# Patient Record
Sex: MALE | Race: BLACK OR AFRICAN AMERICAN | NOT HISPANIC OR LATINO
[De-identification: names, ages, dates, MRNs, and addresses within clinical notes are randomized per-mention and may not be internally consistent; named-entity substitution may affect disease eponyms.]

---

## 2017-03-20 ENCOUNTER — APPOINTMENT (OUTPATIENT)
Dept: UROLOGY | Facility: CLINIC | Age: 47
End: 2017-03-20

## 2017-04-24 ENCOUNTER — RECORD ABSTRACTING (OUTPATIENT)
Age: 47
End: 2017-04-24

## 2017-04-24 DIAGNOSIS — Z83.3 FAMILY HISTORY OF DIABETES MELLITUS: ICD-10-CM

## 2017-04-24 DIAGNOSIS — Z86.79 PERSONAL HISTORY OF OTHER DISEASES OF THE CIRCULATORY SYSTEM: ICD-10-CM

## 2017-04-24 DIAGNOSIS — E78.00 PURE HYPERCHOLESTEROLEMIA, UNSPECIFIED: ICD-10-CM

## 2017-04-24 DIAGNOSIS — Z82.49 FAMILY HISTORY OF ISCHEMIC HEART DISEASE AND OTHER DISEASES OF THE CIRCULATORY SYSTEM: ICD-10-CM

## 2017-04-24 DIAGNOSIS — Z78.9 OTHER SPECIFIED HEALTH STATUS: ICD-10-CM

## 2017-05-08 ENCOUNTER — APPOINTMENT (OUTPATIENT)
Dept: UROLOGY | Facility: CLINIC | Age: 47
End: 2017-05-08

## 2017-05-08 VITALS
BODY MASS INDEX: 34.51 KG/M2 | DIASTOLIC BLOOD PRESSURE: 84 MMHG | HEART RATE: 105 BPM | WEIGHT: 233 LBS | HEIGHT: 69 IN | SYSTOLIC BLOOD PRESSURE: 142 MMHG

## 2017-05-08 RX ORDER — OXAPROZIN 600 MG/1
TABLET, FILM COATED ORAL
Refills: 0 | Status: DISCONTINUED | COMMUNITY
End: 2017-05-08

## 2017-05-24 ENCOUNTER — APPOINTMENT (OUTPATIENT)
Dept: UROLOGY | Facility: CLINIC | Age: 47
End: 2017-05-24

## 2017-05-24 VITALS
WEIGHT: 233 LBS | SYSTOLIC BLOOD PRESSURE: 144 MMHG | HEART RATE: 106 BPM | BODY MASS INDEX: 34.51 KG/M2 | HEIGHT: 69 IN | DIASTOLIC BLOOD PRESSURE: 80 MMHG

## 2017-07-10 ENCOUNTER — APPOINTMENT (OUTPATIENT)
Dept: UROLOGY | Facility: CLINIC | Age: 47
End: 2017-07-10

## 2017-07-10 VITALS — SYSTOLIC BLOOD PRESSURE: 139 MMHG | HEART RATE: 108 BPM | DIASTOLIC BLOOD PRESSURE: 69 MMHG

## 2017-07-21 ENCOUNTER — RX RENEWAL (OUTPATIENT)
Age: 47
End: 2017-07-21

## 2017-08-23 ENCOUNTER — APPOINTMENT (OUTPATIENT)
Dept: UROLOGY | Facility: CLINIC | Age: 47
End: 2017-08-23
Payer: COMMERCIAL

## 2017-08-23 VITALS
HEART RATE: 104 BPM | DIASTOLIC BLOOD PRESSURE: 73 MMHG | SYSTOLIC BLOOD PRESSURE: 125 MMHG | HEIGHT: 69 IN | WEIGHT: 224 LBS | BODY MASS INDEX: 33.18 KG/M2

## 2017-08-23 PROCEDURE — 99213 OFFICE O/P EST LOW 20 MIN: CPT

## 2017-09-20 ENCOUNTER — APPOINTMENT (OUTPATIENT)
Dept: UROLOGY | Facility: CLINIC | Age: 47
End: 2017-09-20
Payer: COMMERCIAL

## 2017-09-20 VITALS
WEIGHT: 224 LBS | HEART RATE: 103 BPM | BODY MASS INDEX: 33.18 KG/M2 | DIASTOLIC BLOOD PRESSURE: 91 MMHG | HEIGHT: 69 IN | SYSTOLIC BLOOD PRESSURE: 148 MMHG

## 2017-09-20 PROCEDURE — 99214 OFFICE O/P EST MOD 30 MIN: CPT

## 2017-09-20 RX ORDER — TESTOSTERONE CYPIONATE 200 MG/ML
200 INJECTION, SOLUTION INTRAMUSCULAR
Qty: 3 | Refills: 0 | Status: DISCONTINUED | COMMUNITY
Start: 2017-07-10 | End: 2017-09-20

## 2017-10-20 ENCOUNTER — RX RENEWAL (OUTPATIENT)
Age: 47
End: 2017-10-20

## 2017-11-01 RX ORDER — TESTOSTERONE CYPIONATE 200 MG/ML
200 INJECTION, SOLUTION INTRAMUSCULAR
Qty: 4 | Refills: 0 | Status: DISCONTINUED | COMMUNITY
Start: 2017-05-08 | End: 2017-11-01

## 2017-11-24 ENCOUNTER — TRANSCRIPTION ENCOUNTER (OUTPATIENT)
Age: 47
End: 2017-11-24

## 2017-11-27 ENCOUNTER — TRANSCRIPTION ENCOUNTER (OUTPATIENT)
Age: 47
End: 2017-11-27

## 2017-11-27 ENCOUNTER — CLINICAL ADVICE (OUTPATIENT)
Age: 47
End: 2017-11-27

## 2017-12-19 ENCOUNTER — APPOINTMENT (OUTPATIENT)
Dept: UROLOGY | Facility: CLINIC | Age: 47
End: 2017-12-19
Payer: COMMERCIAL

## 2017-12-19 VITALS
WEIGHT: 224 LBS | DIASTOLIC BLOOD PRESSURE: 85 MMHG | HEIGHT: 69 IN | BODY MASS INDEX: 33.18 KG/M2 | HEART RATE: 100 BPM | SYSTOLIC BLOOD PRESSURE: 148 MMHG

## 2017-12-19 PROCEDURE — 99213 OFFICE O/P EST LOW 20 MIN: CPT

## 2017-12-19 RX ORDER — SILDENAFIL CITRATE 100 MG/1
100 TABLET, FILM COATED ORAL
Qty: 5 | Refills: 3 | Status: COMPLETED | COMMUNITY
Start: 2017-09-20 | End: 2017-12-19

## 2017-12-28 ENCOUNTER — OTHER (OUTPATIENT)
Age: 47
End: 2017-12-28

## 2018-01-02 ENCOUNTER — TRANSCRIPTION ENCOUNTER (OUTPATIENT)
Age: 48
End: 2018-01-02

## 2018-01-02 ENCOUNTER — CLINICAL ADVICE (OUTPATIENT)
Age: 48
End: 2018-01-02

## 2018-02-27 ENCOUNTER — TRANSCRIPTION ENCOUNTER (OUTPATIENT)
Age: 48
End: 2018-02-27

## 2018-03-28 ENCOUNTER — MEDICATION RENEWAL (OUTPATIENT)
Age: 48
End: 2018-03-28

## 2018-03-28 ENCOUNTER — TRANSCRIPTION ENCOUNTER (OUTPATIENT)
Age: 48
End: 2018-03-28

## 2018-04-27 ENCOUNTER — MEDICATION RENEWAL (OUTPATIENT)
Age: 48
End: 2018-04-27

## 2018-04-27 ENCOUNTER — TRANSCRIPTION ENCOUNTER (OUTPATIENT)
Age: 48
End: 2018-04-27

## 2018-05-30 ENCOUNTER — TRANSCRIPTION ENCOUNTER (OUTPATIENT)
Age: 48
End: 2018-05-30

## 2018-05-30 ENCOUNTER — MEDICATION RENEWAL (OUTPATIENT)
Age: 48
End: 2018-05-30

## 2018-06-18 ENCOUNTER — APPOINTMENT (OUTPATIENT)
Dept: UROLOGY | Facility: CLINIC | Age: 48
End: 2018-06-18
Payer: COMMERCIAL

## 2018-06-18 VITALS
WEIGHT: 230 LBS | HEIGHT: 69 IN | SYSTOLIC BLOOD PRESSURE: 141 MMHG | BODY MASS INDEX: 34.07 KG/M2 | HEART RATE: 105 BPM | DIASTOLIC BLOOD PRESSURE: 78 MMHG

## 2018-06-18 PROCEDURE — 99215 OFFICE O/P EST HI 40 MIN: CPT

## 2018-06-29 ENCOUNTER — MEDICATION RENEWAL (OUTPATIENT)
Age: 48
End: 2018-06-29

## 2018-06-29 ENCOUNTER — TRANSCRIPTION ENCOUNTER (OUTPATIENT)
Age: 48
End: 2018-06-29

## 2018-08-03 ENCOUNTER — TRANSCRIPTION ENCOUNTER (OUTPATIENT)
Age: 48
End: 2018-08-03

## 2018-08-03 ENCOUNTER — MEDICATION RENEWAL (OUTPATIENT)
Age: 48
End: 2018-08-03

## 2018-08-09 ENCOUNTER — TRANSCRIPTION ENCOUNTER (OUTPATIENT)
Age: 48
End: 2018-08-09

## 2018-08-09 ENCOUNTER — MEDICATION RENEWAL (OUTPATIENT)
Age: 48
End: 2018-08-09

## 2018-09-04 ENCOUNTER — TRANSCRIPTION ENCOUNTER (OUTPATIENT)
Age: 48
End: 2018-09-04

## 2018-09-05 ENCOUNTER — MEDICATION RENEWAL (OUTPATIENT)
Age: 48
End: 2018-09-05

## 2018-10-02 ENCOUNTER — TRANSCRIPTION ENCOUNTER (OUTPATIENT)
Age: 48
End: 2018-10-02

## 2018-11-06 ENCOUNTER — TRANSCRIPTION ENCOUNTER (OUTPATIENT)
Age: 48
End: 2018-11-06

## 2018-11-06 ENCOUNTER — MEDICATION RENEWAL (OUTPATIENT)
Age: 48
End: 2018-11-06

## 2018-12-14 ENCOUNTER — TRANSCRIPTION ENCOUNTER (OUTPATIENT)
Age: 48
End: 2018-12-14

## 2019-01-04 ENCOUNTER — TRANSCRIPTION ENCOUNTER (OUTPATIENT)
Age: 49
End: 2019-01-04

## 2019-01-04 ENCOUNTER — CLINICAL ADVICE (OUTPATIENT)
Age: 49
End: 2019-01-04

## 2019-01-23 ENCOUNTER — APPOINTMENT (OUTPATIENT)
Dept: UROLOGY | Facility: CLINIC | Age: 49
End: 2019-01-23
Payer: COMMERCIAL

## 2019-01-23 VITALS
BODY MASS INDEX: 34.07 KG/M2 | DIASTOLIC BLOOD PRESSURE: 83 MMHG | SYSTOLIC BLOOD PRESSURE: 156 MMHG | HEART RATE: 105 BPM | HEIGHT: 69 IN | WEIGHT: 230 LBS

## 2019-01-23 PROCEDURE — 99214 OFFICE O/P EST MOD 30 MIN: CPT

## 2019-01-23 NOTE — LETTER BODY
[Dear  ___] : Dear  [unfilled], [Consult Letter:] : I had the pleasure of evaluating your patient, [unfilled]. [Please see my note below.] : Please see my note below. [Consult Closing:] : Thank you very much for allowing me to participate in the care of this patient.  If you have any questions, please do not hesitate to contact me. [Sincerely,] : Sincerely, [FreeTextEntry2] : Dr. Compa Bob [FreeTextEntry3] : Eddi Haddad MD\par Director of Male Sexual Dysfunction and Urologic Prosthetics

## 2019-01-23 NOTE — HISTORY OF PRESENT ILLNESS
[Currently Experiencing ___] :  [unfilled] [Erectile Dysfunction] : Erectile Dysfunction [None] : None [FreeTextEntry1] : JYOTI VÁZQUEZ is a 48 year old male with a past medical history of testicular hypofunction and impotence. Presents to the office today for a follow up, last seen on 6/18/2018. Patient currently on Testosterone cypionate injections 0.5 ml, , 100 mg ,every 5 days as prescribed, with good results. Libido and energy both are good and patient happy. As per ED, patient on Viagra 100 mg as needed, with good results. Denies any urinary issues, including dysuria, hematuria, fever, or chills. \par \par 1/4/2019.- Trough\par -hemoglobin 13.4, platelets 207\par -Normal LFT\par -Total T 416 (250-1100), Bioavailable 152.9 (110-575), testosterone free 71.3 ()\par \par 1/5/2019.- Peak\par -PSA 0.6 ng/ml, 33 % Free PSA\par -Total T 599 (250-1100), Bioavailable 247.6 (110-575), testosterone free 117.9 ()

## 2019-01-23 NOTE — ASSESSMENT
[FreeTextEntry1] : JYOTI VÁZQUEZ is a 48 year old male with a past medical history of testicular hypofunction and impotence. Presents to the office today for a follow up, last seen on 6/18/2018. Patient currently on Testosterone cypionate injections 0.5 ml, , 100 mg ,every 5 days as prescribed, with good results. Libido and energy both are good and patient happy. As per ED, patient on Viagra 100 mg as needed, with good results. Denies any urinary issues, including dysuria, hematuria, fever, or chills. \par \par 1/4/2019.- Trough\par -hemoglobin 13.4, platelets 207\par -Normal LFT\par -Total T 416 (250-1100), Bioavailable 152.9 (110-575), testosterone free 71.3 ()\par \par 1/5/2019.- Peak\par -PSA 0.6 ng/ml, 33 % Free PSA\par -Total T 599 (250-1100), Bioavailable 247.6 (110-575), testosterone free 117.9 ()

## 2019-02-11 ENCOUNTER — TRANSCRIPTION ENCOUNTER (OUTPATIENT)
Age: 49
End: 2019-02-11

## 2019-03-07 ENCOUNTER — TRANSCRIPTION ENCOUNTER (OUTPATIENT)
Age: 49
End: 2019-03-07

## 2019-03-12 ENCOUNTER — TRANSCRIPTION ENCOUNTER (OUTPATIENT)
Age: 49
End: 2019-03-12

## 2019-04-19 ENCOUNTER — TRANSCRIPTION ENCOUNTER (OUTPATIENT)
Age: 49
End: 2019-04-19

## 2019-05-17 ENCOUNTER — TRANSCRIPTION ENCOUNTER (OUTPATIENT)
Age: 49
End: 2019-05-17

## 2019-06-12 ENCOUNTER — TRANSCRIPTION ENCOUNTER (OUTPATIENT)
Age: 49
End: 2019-06-12

## 2019-07-16 ENCOUNTER — TRANSCRIPTION ENCOUNTER (OUTPATIENT)
Age: 49
End: 2019-07-16

## 2019-07-17 ENCOUNTER — TRANSCRIPTION ENCOUNTER (OUTPATIENT)
Age: 49
End: 2019-07-17

## 2019-07-22 ENCOUNTER — TRANSCRIPTION ENCOUNTER (OUTPATIENT)
Age: 49
End: 2019-07-22

## 2019-08-19 ENCOUNTER — TRANSCRIPTION ENCOUNTER (OUTPATIENT)
Age: 49
End: 2019-08-19

## 2019-08-20 ENCOUNTER — TRANSCRIPTION ENCOUNTER (OUTPATIENT)
Age: 49
End: 2019-08-20

## 2019-09-24 ENCOUNTER — TRANSCRIPTION ENCOUNTER (OUTPATIENT)
Age: 49
End: 2019-09-24

## 2019-10-21 ENCOUNTER — TRANSCRIPTION ENCOUNTER (OUTPATIENT)
Age: 49
End: 2019-10-21

## 2019-10-22 ENCOUNTER — OUTPATIENT (OUTPATIENT)
Dept: OUTPATIENT SERVICES | Facility: HOSPITAL | Age: 49
LOS: 1 days | Discharge: HOME | End: 2019-10-22
Payer: COMMERCIAL

## 2019-10-22 ENCOUNTER — TRANSCRIPTION ENCOUNTER (OUTPATIENT)
Age: 49
End: 2019-10-22

## 2019-10-22 ENCOUNTER — RESULT REVIEW (OUTPATIENT)
Age: 49
End: 2019-10-22

## 2019-10-22 VITALS
TEMPERATURE: 99 F | SYSTOLIC BLOOD PRESSURE: 143 MMHG | WEIGHT: 229.94 LBS | DIASTOLIC BLOOD PRESSURE: 93 MMHG | HEART RATE: 117 BPM | RESPIRATION RATE: 18 BRPM | HEIGHT: 69 IN

## 2019-10-22 VITALS — RESPIRATION RATE: 18 BRPM | DIASTOLIC BLOOD PRESSURE: 74 MMHG | HEART RATE: 95 BPM | SYSTOLIC BLOOD PRESSURE: 110 MMHG

## 2019-10-22 PROCEDURE — 88312 SPECIAL STAINS GROUP 1: CPT | Mod: 26

## 2019-10-22 PROCEDURE — 88305 TISSUE EXAM BY PATHOLOGIST: CPT | Mod: 26

## 2019-10-22 NOTE — H&P PST ADULT - HISTORY OF PRESENT ILLNESS
A 48 y old man with pmhx of HTN, DLD presented for screening Colonoscopy and EGD. Pt reports epigastric pain

## 2019-10-24 LAB — SURGICAL PATHOLOGY STUDY: SIGNIFICANT CHANGE UP

## 2019-10-28 DIAGNOSIS — R10.13 EPIGASTRIC PAIN: ICD-10-CM

## 2019-10-28 DIAGNOSIS — E78.2 MIXED HYPERLIPIDEMIA: ICD-10-CM

## 2019-10-28 DIAGNOSIS — K29.50 UNSPECIFIED CHRONIC GASTRITIS WITHOUT BLEEDING: ICD-10-CM

## 2019-10-28 DIAGNOSIS — K20.9 ESOPHAGITIS, UNSPECIFIED: ICD-10-CM

## 2019-10-28 DIAGNOSIS — G47.33 OBSTRUCTIVE SLEEP APNEA (ADULT) (PEDIATRIC): ICD-10-CM

## 2019-10-28 DIAGNOSIS — Z88.0 ALLERGY STATUS TO PENICILLIN: ICD-10-CM

## 2019-10-28 DIAGNOSIS — I10 ESSENTIAL (PRIMARY) HYPERTENSION: ICD-10-CM

## 2019-11-25 ENCOUNTER — TRANSCRIPTION ENCOUNTER (OUTPATIENT)
Age: 49
End: 2019-11-25

## 2019-11-25 ENCOUNTER — CLINICAL ADVICE (OUTPATIENT)
Age: 49
End: 2019-11-25

## 2019-11-25 RX ORDER — TESTOSTERONE CYPIONATE 200 MG/ML
200 INJECTION, SOLUTION INTRAMUSCULAR
Qty: 6 | Refills: 0 | Status: DISCONTINUED | COMMUNITY
Start: 2017-07-10 | End: 2019-11-25

## 2019-12-31 ENCOUNTER — TRANSCRIPTION ENCOUNTER (OUTPATIENT)
Age: 49
End: 2019-12-31

## 2020-01-24 PROBLEM — I10 ESSENTIAL (PRIMARY) HYPERTENSION: Chronic | Status: ACTIVE | Noted: 2019-10-22

## 2020-01-24 PROBLEM — G47.30 SLEEP APNEA, UNSPECIFIED: Chronic | Status: ACTIVE | Noted: 2019-10-22

## 2020-01-24 PROBLEM — E78.2 MIXED HYPERLIPIDEMIA: Chronic | Status: ACTIVE | Noted: 2019-10-22

## 2020-02-05 ENCOUNTER — TRANSCRIPTION ENCOUNTER (OUTPATIENT)
Age: 50
End: 2020-02-05

## 2020-03-09 ENCOUNTER — TRANSCRIPTION ENCOUNTER (OUTPATIENT)
Age: 50
End: 2020-03-09

## 2020-03-11 ENCOUNTER — APPOINTMENT (OUTPATIENT)
Dept: UROLOGY | Facility: CLINIC | Age: 50
End: 2020-03-11
Payer: COMMERCIAL

## 2020-03-11 PROCEDURE — 99214 OFFICE O/P EST MOD 30 MIN: CPT

## 2020-03-31 NOTE — HISTORY OF PRESENT ILLNESS
[Currently Experiencing ___] :  [unfilled] [Erectile Dysfunction] : Erectile Dysfunction [None] : None [FreeTextEntry1] : JYOTI VÁZQUEZ is a 49 year old male with a past medical history of testicular hypofunction and impotence. Presents to the office today for a follow up, last seen on 1/23/2019. Patient currently on Testosterone cypionate injections 0.5 ml, , 100 mg ,every 5 days as prescribed, with good results. Libido and energy both are good and patient happy. As per ED, patient on Viagra 100 mg as needed, with good results. Denies any urinary issues, including dysuria, hematuria, fever, or chills. \par \par 2/19/2020\par -PSA 0.6 ng/ml, 33 % free PSA\par -Total testosterone 762 (250-1100), Bioavailable 289.3 (110-575), testosterone free 140.7 ()\par \par 1/4/2019.- Trough\par -hemoglobin 13.4, platelets 207\par -Normal LFT\par -Total T 416 (250-1100), Bioavailable 152.9 (110-575), testosterone free 71.3 ()\par \par 1/5/2019.- Peak\par -PSA 0.6 ng/ml, 33 % Free PSA\par -Total T 599 (250-1100), Bioavailable 247.6 (110-575), testosterone free 117.9 () \par \par

## 2020-03-31 NOTE — ASSESSMENT
[FreeTextEntry1] : JYOTI VÁZQUEZ is a 49 year old male with a past medical history of testicular hypofunction and impotence. Presents to the office today for a follow up, last seen on 1/23/2019. Patient currently on Testosterone cypionate injections 0.5 ml, , 100 mg ,every 5 days as prescribed, with good results. Libido and energy both are good and patient happy. As per ED, patient on Viagra 100 mg as needed, with good results. Denies any urinary issues, including dysuria, hematuria, fever, or chills. \par \par 2/19/2020\par -PSA 0.6 ng/ml, 33 % free PSA\par -Total testosterone 762 (250-1100), Bioavailable 289.3 (110-575), testosterone free 140.7 ()\par \par 1/4/2019.- Trough\par -hemoglobin 13.4, platelets 207\par -Normal LFT\par -Total T 416 (250-1100), Bioavailable 152.9 (110-575), testosterone free 71.3 ()\par \par 1/5/2019.- Peak\par -PSA 0.6 ng/ml, 33 % Free PSA\par -Total T 599 (250-1100), Bioavailable 247.6 (110-575), testosterone free 117.9 () \par \par

## 2020-03-31 NOTE — ADDENDUM
[FreeTextEntry1] : I personally examined the patient and discussed the plan with the Physician Assistant/Nurse Practitioner at the time of the visit. I agree with the assessment and plan as written, unless noted below. \par \par testosterone (controlled substance) reordered

## 2020-04-23 ENCOUNTER — TRANSCRIPTION ENCOUNTER (OUTPATIENT)
Age: 50
End: 2020-04-23

## 2020-06-14 ENCOUNTER — TRANSCRIPTION ENCOUNTER (OUTPATIENT)
Age: 50
End: 2020-06-14

## 2020-07-10 ENCOUNTER — OUTPATIENT (OUTPATIENT)
Dept: OUTPATIENT SERVICES | Facility: HOSPITAL | Age: 50
LOS: 1 days | Discharge: HOME | End: 2020-07-10

## 2020-07-10 DIAGNOSIS — Z11.59 ENCOUNTER FOR SCREENING FOR OTHER VIRAL DISEASES: ICD-10-CM

## 2020-07-13 ENCOUNTER — OUTPATIENT (OUTPATIENT)
Dept: OUTPATIENT SERVICES | Facility: HOSPITAL | Age: 50
LOS: 1 days | Discharge: HOME | End: 2020-07-13
Payer: COMMERCIAL

## 2020-07-13 LAB
ANION GAP SERPL CALC-SCNC: 19 MMOL/L — HIGH (ref 7–14)
BUN SERPL-MCNC: 23 MG/DL — HIGH (ref 10–20)
CALCIUM SERPL-MCNC: 10.1 MG/DL — SIGNIFICANT CHANGE UP (ref 8.5–10.1)
CHLORIDE SERPL-SCNC: 99 MMOL/L — SIGNIFICANT CHANGE UP (ref 98–110)
CO2 SERPL-SCNC: 24 MMOL/L — SIGNIFICANT CHANGE UP (ref 17–32)
CREAT SERPL-MCNC: 1.2 MG/DL — SIGNIFICANT CHANGE UP (ref 0.7–1.5)
GLUCOSE SERPL-MCNC: 123 MG/DL — HIGH (ref 70–99)
HCT VFR BLD CALC: 40.6 % — LOW (ref 42–52)
HGB BLD-MCNC: 13.7 G/DL — LOW (ref 14–18)
MCHC RBC-ENTMCNC: 30.6 PG — SIGNIFICANT CHANGE UP (ref 27–31)
MCHC RBC-ENTMCNC: 33.7 G/DL — SIGNIFICANT CHANGE UP (ref 32–37)
MCV RBC AUTO: 90.8 FL — SIGNIFICANT CHANGE UP (ref 80–94)
NRBC # BLD: 0 /100 WBCS — SIGNIFICANT CHANGE UP (ref 0–0)
PLATELET # BLD AUTO: 187 K/UL — SIGNIFICANT CHANGE UP (ref 130–400)
POTASSIUM SERPL-MCNC: 4 MMOL/L — SIGNIFICANT CHANGE UP (ref 3.5–5)
POTASSIUM SERPL-SCNC: 4 MMOL/L — SIGNIFICANT CHANGE UP (ref 3.5–5)
RBC # BLD: 4.47 M/UL — LOW (ref 4.7–6.1)
RBC # FLD: 13.7 % — SIGNIFICANT CHANGE UP (ref 11.5–14.5)
SODIUM SERPL-SCNC: 142 MMOL/L — SIGNIFICANT CHANGE UP (ref 135–146)
WBC # BLD: 8.27 K/UL — SIGNIFICANT CHANGE UP (ref 4.8–10.8)
WBC # FLD AUTO: 8.27 K/UL — SIGNIFICANT CHANGE UP (ref 4.8–10.8)

## 2020-07-13 PROCEDURE — 93458 L HRT ARTERY/VENTRICLE ANGIO: CPT | Mod: 26

## 2020-07-13 NOTE — H&P CARDIOLOGY - HISTORY OF PRESENT ILLNESS
Patient is a 49y Male PMH: elevated TG, HLD, RAYMUNDO uses cpap, HTN, never smoker, +FH MI. Pt had abnormal ETT(PVC's/NSVT 3-5 beats) for clearance for employment, [facility] recommended.      Pre cath note:    indication:  [ ] STEMI                [ ] NSTEMI                 [ ] Acute coronary syndrome                     [ ]Unstable Angina   [ ] high risk  [ ] intermediate risk  [ ] low risk                     [ ] Stable Angina     non-invasive testing:  ETT                        Date:  7/9/20                   result: [ ] high risk  [x ] intermediate risk  [ ] low risk    Anti- Anginal medications:                    [x ] not used                       [ ] used                   [ ] not used but strong indication not to use    Ejection Fraction                   [ ] <29            [ ] 30-39%   [ ] 40-49%     [ ]>50%    CHF                   [ ] active (within last 14 days on meds   [ ] Chronic (on meds but no exacerbation)    COPD                   [ ] mild (on chronic bronchodilators)  [ ] moderate (on chronic steroid therapy)      [ ] severe (indication for home O2 or PACO2 >50)    Other risk factors:                       [ ] Previous MI                     [ ] CVA/ stroke                    [ ] carotid stent/ CEA                    [ ] PVD/PAD- (arterial aneurysm, non-palpable pulses, tortuous vessel with inability to insert catheter, infra-renal dissection, renal or subclavian artery stenosis)                    [ ] diabetic                    [ ] previous CABG                    [ ] Renal Failure                           13.7   8.27  )-----------( 187      ( 13 Jul 2020 07:40 )             40.6     07-13    142  |  99  |  23<H>  ----------------------------<  123<H>  4.0   |  24  |  1.2    Ca    10.1      13 Jul 2020 07:40                             REVIEW OF SYSTEMS:  CONSTITUTIONAL: No fever, weight loss, or fatigue  CARDIOLOGY: PAtient denies chest pain, shortness of breath or syncopal episodes.   RESPIRATORY: denies shortness of breath, wheezing  NEUROLOGICAL: NO weakness, no focal deficits to report.  GI: no BRBPR, no N,V,diarrhea.     PHYSICAL EXAM:  · CONSTITUTIONAL:	Well-developed, well nourished     ·RESPIRATORY:   airway patent; breath sounds equal; good air movement; respirations non-labored; clear to auscultation bilaterally; no chest wall tenderness; no intercostal retractions; no rales,rhonchi or wheeze  · CARDIOVASCULAR	regular rate and rhythm  no rub  no murmur  normal PMI  · EXTREMITIES: No cyanosis, clubbing or edema  · VASCULAR: 	Equal and normal pulses (carotid, femoral, dorsalis pedis)  	R yaakov test WNL    ECG: S. Tach     LABS:                        13.7   8.27  )-----------( 187      ( 13 Jul 2020 07:40 )             40.6     07-13    142  |  99  |  23<H>  ----------------------------<  123<H>  4.0   |  24  |  1.2    Ca    10.1      13 Jul 2020 07:40 Patient is a 49y Male PMH: elevated TG, HLD, RAYMUNDO uses cpap, HTN, never smoker, +FH MI. Pt had abnormal ETT(PVC's/NSVT 3-5 beats) for clearance for employment, [facility] recommended.      Pre cath note:    indication:  [ ] STEMI                [ ] NSTEMI                 [ ] Acute coronary syndrome                     [ ]Unstable Angina   [ ] high risk  [ ] intermediate risk  [ ] low risk                     [ ] Stable Angina     non-invasive testing:  ETT                        Date:  7/9/20                   result: [ ] high risk  [x ] intermediate risk  [ ] low risk    Anti- Anginal medications:                    [ ] not used                       [ x] used                   [ ] not used but strong indication not to use    Ejection Fraction                   [ ] <29            [ ] 30-39%   [ ] 40-49%     [ ]>50%    CHF                   [ ] active (within last 14 days on meds   [ ] Chronic (on meds but no exacerbation)    COPD                   [ ] mild (on chronic bronchodilators)  [ ] moderate (on chronic steroid therapy)      [ ] severe (indication for home O2 or PACO2 >50)    Other risk factors:                       [ ] Previous MI                     [ ] CVA/ stroke                    [ ] carotid stent/ CEA                    [ ] PVD/PAD- (arterial aneurysm, non-palpable pulses, tortuous vessel with inability to insert catheter, infra-renal dissection, renal or subclavian artery stenosis)                    [ ] diabetic                    [ ] previous CABG                    [ ] Renal Failure                           13.7   8.27  )-----------( 187      ( 13 Jul 2020 07:40 )             40.6     07-13    142  |  99  |  23<H>  ----------------------------<  123<H>  4.0   |  24  |  1.2    Ca    10.1      13 Jul 2020 07:40                             REVIEW OF SYSTEMS:  CONSTITUTIONAL: No fever, weight loss, or fatigue  CARDIOLOGY: PAtient denies chest pain, shortness of breath or syncopal episodes.   RESPIRATORY: denies shortness of breath, wheezing  NEUROLOGICAL: NO weakness, no focal deficits to report.  GI: no BRBPR, no N,V,diarrhea.     PHYSICAL EXAM:  · CONSTITUTIONAL:	Well-developed, well nourished     ·RESPIRATORY:   airway patent; breath sounds equal; good air movement; respirations non-labored; clear to auscultation bilaterally; no chest wall tenderness; no intercostal retractions; no rales,rhonchi or wheeze  · CARDIOVASCULAR	regular rate and rhythm  no rub  no murmur  normal PMI  · EXTREMITIES: No cyanosis, clubbing or edema  · VASCULAR: 	Equal and normal pulses (carotid, femoral, dorsalis pedis)  	R yaakov test WNL    ECG: S. Tach     LABS:                        13.7   8.27  )-----------( 187      ( 13 Jul 2020 07:40 )             40.6     07-13    142  |  99  |  23<H>  ----------------------------<  123<H>  4.0   |  24  |  1.2    Ca    10.1      13 Jul 2020 07:40

## 2020-07-13 NOTE — CHART NOTE - NSCHARTNOTEFT_GEN_A_CORE
PRE-OP DIAGNOSIS:  NSVT /' ABN ETT  PROCEDURE: [facility] with coronary angiography    Physician: Dr Mckeon  Assistant: Dr. Papo Ochoa    ANESTHESIA TYPE:  [  ]General Anesthesia  [ x ] Sedation  [ x ] Local/Regional    ESTIMATED BLOOD LOSS:    10   mL    CONDITION  [  ] Critical  [  ] Serious  [  ]Fair  [ x ]Good    IV CONTRAST:    40         mL    FINDINGS  Left Heart Catheterization:  LVEF%:  LVEDP: normal  [ ] Normal Coronary Arteries  [x ] Luminal Irregularities  [ ] Non-obstructive CAD    ACCESS:    [x ] right radial artery  [ ] right femoral artery    LEFT HEART CATHETERIZATION                                  R dominant  Left main:  minor luminal irreg  LAD:  minor luminal irreg,  tortuousity present mid-distal lad  Diag: normal  Left Circumflex: minor luminal irregularities  OM: normal  Right Coronary Artery: minor luminal irregularities  RPDA: normal  Ramus Intermed:    INTERVENTION  IMPLANTS: none        POST-OP DIAGNOSIS  Minor luminal irregularities          PLAN OF CARE  [x ] D/C Home today without change in current medications after post cath IVF, monitoring, and d-stat removal. PRE-OP DIAGNOSIS:  NSVT /' ABN ETT  PROCEDURE: [facility] with coronary angiography    Physician: Dr Mckeon  Assistant: Dr. Papo Ochoa    ANESTHESIA TYPE:  [  ]General Anesthesia  [ x ] Sedation  [ x ] Local/Regional    ESTIMATED BLOOD LOSS:    10   mL    CONDITION  [  ] Critical  [  ] Serious  [  ]Fair  [ x ]Good    IV CONTRAST:    40         mL    FINDINGS  Left Heart Catheterization:  LVEF%:  LVEDP: normal  [ ] Normal Coronary Arteries  [x ] Luminal Irregularities  [ ] Non-obstructive CAD    ACCESS:    [x ] right radial artery  [ ] right femoral artery    LEFT HEART CATHETERIZATION                                  R dominant  Left main:  minor luminal irreg  LAD:  minor luminal irreg,  tortuousity present mid-distal lad  Diag: normal  Left Circumflex: minor luminal irregularities  OM: normal  Right Coronary Artery: minor luminal irregularities  RPDA: normal  Ramus Intermed:    INTERVENTION  IMPLANTS: none        POST-OP DIAGNOSIS  Minor luminal irregularities          PLAN OF CARE  [x ] D/C Home today without change in current medications (continue aspirin and cholesterol medications) after post cath IVF, monitoring, and d-stat removal.

## 2020-07-17 DIAGNOSIS — R94.39 ABNORMAL RESULT OF OTHER CARDIOVASCULAR FUNCTION STUDY: ICD-10-CM

## 2020-07-17 DIAGNOSIS — G47.33 OBSTRUCTIVE SLEEP APNEA (ADULT) (PEDIATRIC): ICD-10-CM

## 2020-07-17 DIAGNOSIS — I10 ESSENTIAL (PRIMARY) HYPERTENSION: ICD-10-CM

## 2020-07-17 DIAGNOSIS — Z82.49 FAMILY HISTORY OF ISCHEMIC HEART DISEASE AND OTHER DISEASES OF THE CIRCULATORY SYSTEM: ICD-10-CM

## 2020-07-17 DIAGNOSIS — E78.2 MIXED HYPERLIPIDEMIA: ICD-10-CM

## 2020-07-17 DIAGNOSIS — Z88.0 ALLERGY STATUS TO PENICILLIN: ICD-10-CM

## 2020-07-19 ENCOUNTER — TRANSCRIPTION ENCOUNTER (OUTPATIENT)
Age: 50
End: 2020-07-19

## 2020-08-29 ENCOUNTER — TRANSCRIPTION ENCOUNTER (OUTPATIENT)
Age: 50
End: 2020-08-29

## 2020-09-30 ENCOUNTER — TRANSCRIPTION ENCOUNTER (OUTPATIENT)
Age: 50
End: 2020-09-30

## 2020-10-27 ENCOUNTER — APPOINTMENT (OUTPATIENT)
Dept: CARDIOLOGY | Facility: CLINIC | Age: 50
End: 2020-10-27
Payer: COMMERCIAL

## 2020-10-27 VITALS
DIASTOLIC BLOOD PRESSURE: 63 MMHG | HEART RATE: 87 BPM | BODY MASS INDEX: 34.07 KG/M2 | WEIGHT: 230 LBS | SYSTOLIC BLOOD PRESSURE: 130 MMHG | HEIGHT: 69 IN

## 2020-10-27 PROCEDURE — 99204 OFFICE O/P NEW MOD 45 MIN: CPT

## 2020-10-27 PROCEDURE — 99072 ADDL SUPL MATRL&STAF TM PHE: CPT

## 2020-10-27 PROCEDURE — 93000 ELECTROCARDIOGRAM COMPLETE: CPT

## 2020-10-27 RX ORDER — VALSARTAN 320 MG/1
320 TABLET, COATED ORAL
Refills: 0 | Status: COMPLETED | COMMUNITY
End: 2020-10-27

## 2020-10-27 RX ORDER — SYRINGE WITH NEEDLE, 1 ML 25GX5/8"
21G X 1" SYRINGE, EMPTY DISPOSABLE MISCELLANEOUS
Qty: 8 | Refills: 0 | Status: COMPLETED | COMMUNITY
Start: 2017-07-21 | End: 2020-10-27

## 2020-10-27 RX ORDER — METHYLPREDNISOLONE 4 MG/1
4 TABLET ORAL
Qty: 21 | Refills: 0 | Status: COMPLETED | COMMUNITY
Start: 2017-04-06 | End: 2020-10-27

## 2020-10-27 NOTE — DISCUSSION/SUMMARY
[FreeTextEntry1] : Mr. Brian Gant is a pleasant 49 year-old man, MTA , with hypertension, hyperlipidemia, RAYMUNDO on CPAP, referred for tachycardia and NSVT on Holter and EST. He has no significant CAD on cath. He has normal EF on echo.\par \par He has sudden death Father at age of 68 during sleep;\par \par Patient has no prior syncope. \par \par BP at goal today; I recommend to continue same medications; \par \par I recommend to continue Metoprolol for NSVT.\par \par I recommend CMR with general anesthesia to evaluate for infiltrative disease of myocardium, cardiac sarcoid, and ARVC. Patient is claustrophobic.\par \par After CMR and MCOT, I will consider him for an EP study. \par \par Since his symptoms do not occur on a daily basis, a Holter monitor is less likely to be helpful in his management. I recommend a 4 weeks event monitor to evaluate for the etiology of his symptoms. I educated the patient on the use of symptoms’ trigger feature of the event monitor. I will reassess patient after completion of the 4 weeks event monitor.\par \par I discussed with him the plan of care in great details. I answered all his questions and he was satisfied with the visit. Patient will follow with me in 4-6 weeks' time. Please do not hesitate to contact me at [phone] if you have any questions regarding his care.

## 2020-10-27 NOTE — PHYSICAL EXAM
[General Appearance - Well Developed] : well developed [Normal Appearance] : normal appearance [Well Groomed] : well groomed [General Appearance - Well Nourished] : well nourished [No Deformities] : no deformities [General Appearance - In No Acute Distress] : no acute distress [Normal Conjunctiva] : the conjunctiva exhibited no abnormalities [Eyelids - No Xanthelasma] : the eyelids demonstrated no xanthelasmas [Normal Oral Mucosa] : normal oral mucosa [No Oral Pallor] : no oral pallor [No Oral Cyanosis] : no oral cyanosis [Normal Jugular Venous A Waves Present] : normal jugular venous A waves present [Normal Jugular Venous V Waves Present] : normal jugular venous V waves present [No Jugular Venous Desir A Waves] : no jugular venous desir A waves [Heart Rate And Rhythm] : heart rate and rhythm were normal [Heart Sounds] : normal S1 and S2 [Murmurs] : no murmurs present [Respiration, Rhythm And Depth] : normal respiratory rhythm and effort [Exaggerated Use Of Accessory Muscles For Inspiration] : no accessory muscle use [Auscultation Breath Sounds / Voice Sounds] : lungs were clear to auscultation bilaterally [Abdomen Soft] : soft [Abdomen Tenderness] : non-tender [Abdomen Mass (___ Cm)] : no abdominal mass palpated [Abnormal Walk] : normal gait [Gait - Sufficient For Exercise Testing] : the gait was sufficient for exercise testing [Nail Clubbing] : no clubbing of the fingernails [Cyanosis, Localized] : no localized cyanosis [Petechial Hemorrhages (___cm)] : no petechial hemorrhages [Skin Color & Pigmentation] : normal skin color and pigmentation [] : no rash [No Venous Stasis] : no venous stasis [Skin Lesions] : no skin lesions [No Skin Ulcers] : no skin ulcer [No Xanthoma] : no  xanthoma was observed [Oriented To Time, Place, And Person] : oriented to person, place, and time [Affect] : the affect was normal [Mood] : the mood was normal [No Anxiety] : not feeling anxious

## 2020-10-27 NOTE — HISTORY OF PRESENT ILLNESS
[FreeTextEntry1] : Referring Physician: Dr. Sylvain Asher\par \par Hypertension, hyperlipidemia, RAYMUNDO on CPAP\par MTA ; had tachycardia on Physical\par referred for cardiac work-up: Holter showed 3 beats NSVT\par EST showed NSVT on exertion\par Father sudden death during sleep at age 68\par Patient has no prior syncope, no palpitations; Johnnie has no chest pain, no shortness of breath, no dyspnea on exertion, no orthopnea, no PND. He denies dizziness, lightheadedness and syncope. He has no exertional symptoms. He presents for evaluation.\par \par CARDIAC TESTING:\par ECG (10/27/2020): sinus rhythm at 87 bpm, no significant ST/T abnormalities\par Cardiac Cath (7/13/2020): minor irregularities\par

## 2020-11-16 ENCOUNTER — TRANSCRIPTION ENCOUNTER (OUTPATIENT)
Age: 50
End: 2020-11-16

## 2020-11-24 ENCOUNTER — OUTPATIENT (OUTPATIENT)
Dept: OUTPATIENT SERVICES | Facility: HOSPITAL | Age: 50
LOS: 1 days | Discharge: HOME | End: 2020-11-24
Payer: COMMERCIAL

## 2020-11-24 VITALS
OXYGEN SATURATION: 96 % | TEMPERATURE: 98 F | HEART RATE: 96 BPM | SYSTOLIC BLOOD PRESSURE: 136 MMHG | RESPIRATION RATE: 18 BRPM | HEIGHT: 69 IN | WEIGHT: 229.94 LBS | DIASTOLIC BLOOD PRESSURE: 87 MMHG

## 2020-11-24 DIAGNOSIS — Z01.818 ENCOUNTER FOR OTHER PREPROCEDURAL EXAMINATION: ICD-10-CM

## 2020-11-24 DIAGNOSIS — I47.2 VENTRICULAR TACHYCARDIA: ICD-10-CM

## 2020-11-24 LAB
ALBUMIN SERPL ELPH-MCNC: 4.8 G/DL — SIGNIFICANT CHANGE UP (ref 3.5–5.2)
ALP SERPL-CCNC: 64 U/L — SIGNIFICANT CHANGE UP (ref 30–115)
ALT FLD-CCNC: 38 U/L — SIGNIFICANT CHANGE UP (ref 0–41)
ANION GAP SERPL CALC-SCNC: 15 MMOL/L — HIGH (ref 7–14)
APTT BLD: 30.1 SEC — SIGNIFICANT CHANGE UP (ref 27–39.2)
AST SERPL-CCNC: 50 U/L — HIGH (ref 0–41)
BASOPHILS # BLD AUTO: 0.06 K/UL — SIGNIFICANT CHANGE UP (ref 0–0.2)
BASOPHILS NFR BLD AUTO: 0.7 % — SIGNIFICANT CHANGE UP (ref 0–1)
BILIRUB SERPL-MCNC: 0.5 MG/DL — SIGNIFICANT CHANGE UP (ref 0.2–1.2)
BUN SERPL-MCNC: 26 MG/DL — HIGH (ref 10–20)
CALCIUM SERPL-MCNC: 9.8 MG/DL — SIGNIFICANT CHANGE UP (ref 8.5–10.1)
CHLORIDE SERPL-SCNC: 100 MMOL/L — SIGNIFICANT CHANGE UP (ref 98–110)
CO2 SERPL-SCNC: 24 MMOL/L — SIGNIFICANT CHANGE UP (ref 17–32)
CREAT SERPL-MCNC: 1.3 MG/DL — SIGNIFICANT CHANGE UP (ref 0.7–1.5)
EOSINOPHIL # BLD AUTO: 0.29 K/UL — SIGNIFICANT CHANGE UP (ref 0–0.7)
EOSINOPHIL NFR BLD AUTO: 3.5 % — SIGNIFICANT CHANGE UP (ref 0–8)
GLUCOSE SERPL-MCNC: 81 MG/DL — SIGNIFICANT CHANGE UP (ref 70–99)
HCT VFR BLD CALC: 41.4 % — LOW (ref 42–52)
HGB BLD-MCNC: 13.5 G/DL — LOW (ref 14–18)
IMM GRANULOCYTES NFR BLD AUTO: 0.5 % — HIGH (ref 0.1–0.3)
INR BLD: 1.03 RATIO — SIGNIFICANT CHANGE UP (ref 0.65–1.3)
LYMPHOCYTES # BLD AUTO: 1.58 K/UL — SIGNIFICANT CHANGE UP (ref 1.2–3.4)
LYMPHOCYTES # BLD AUTO: 18.9 % — LOW (ref 20.5–51.1)
MCHC RBC-ENTMCNC: 29.4 PG — SIGNIFICANT CHANGE UP (ref 27–31)
MCHC RBC-ENTMCNC: 32.6 G/DL — SIGNIFICANT CHANGE UP (ref 32–37)
MCV RBC AUTO: 90.2 FL — SIGNIFICANT CHANGE UP (ref 80–94)
MONOCYTES # BLD AUTO: 0.82 K/UL — HIGH (ref 0.1–0.6)
MONOCYTES NFR BLD AUTO: 9.8 % — HIGH (ref 1.7–9.3)
NEUTROPHILS # BLD AUTO: 5.59 K/UL — SIGNIFICANT CHANGE UP (ref 1.4–6.5)
NEUTROPHILS NFR BLD AUTO: 66.6 % — SIGNIFICANT CHANGE UP (ref 42.2–75.2)
NRBC # BLD: 0 /100 WBCS — SIGNIFICANT CHANGE UP (ref 0–0)
PLATELET # BLD AUTO: 194 K/UL — SIGNIFICANT CHANGE UP (ref 130–400)
POTASSIUM SERPL-MCNC: 4.1 MMOL/L — SIGNIFICANT CHANGE UP (ref 3.5–5)
POTASSIUM SERPL-SCNC: 4.1 MMOL/L — SIGNIFICANT CHANGE UP (ref 3.5–5)
PROT SERPL-MCNC: 7.6 G/DL — SIGNIFICANT CHANGE UP (ref 6–8)
PROTHROM AB SERPL-ACNC: 11.8 SEC — SIGNIFICANT CHANGE UP (ref 9.95–12.87)
RBC # BLD: 4.59 M/UL — LOW (ref 4.7–6.1)
RBC # FLD: 13.8 % — SIGNIFICANT CHANGE UP (ref 11.5–14.5)
SODIUM SERPL-SCNC: 139 MMOL/L — SIGNIFICANT CHANGE UP (ref 135–146)
WBC # BLD: 8.38 K/UL — SIGNIFICANT CHANGE UP (ref 4.8–10.8)
WBC # FLD AUTO: 8.38 K/UL — SIGNIFICANT CHANGE UP (ref 4.8–10.8)

## 2020-11-24 PROCEDURE — 93010 ELECTROCARDIOGRAM REPORT: CPT

## 2020-11-24 NOTE — H&P PST ADULT - HISTORY OF PRESENT ILLNESS
10/27/2020 cardiac/ep note  Mr. Brian Gant is a pleasant 49 year-old man, MTA , with hypertension, hyperlipidemia, RAYMUNDO on CPAP, referred for tachycardia and NSVT on Holter and EST. He has no significant CAD on cath. He has normal EF on echo.  Hypertension, hyperlipidemia, RAYMUNDO on CPAP  MTA ; had tachycardia on Physical  referred for cardiac work-up: Holter showed 3 beats NSVT  EST showed NSVT on exertion  Father sudden death during sleep at age 68  Patient has no prior syncope, no palpitations; Johnnie has no chest pain, no shortness of breath, no dyspnea on exertion, no orthopnea, no PND. He denies dizziness, lightheadedness and syncope. He has no exertional symptoms. He presents for evaluation.    I recommend CMR with general anesthesia to evaluate for infiltrative disease of myocardium, cardiac sarcoid, and ARVC. Patient is claustrophobic.  11/24/2020 past note  pt had high pulse and htn 3/27/2020 on routine work exam and place out of work  pt is [facility]    sent to cardiology stress test, cath, echo done, work wanted more testing saw eps - dr downs   now for mri cardiac     PATIENT CURRENTLY DENIES CHEST PAIN  SHORTNESS OF BREATH  PALPITATIONS,  DYSURIA, OR UPPER RESPIRATORY INFECTION IN PAST 2 WEEKS  EXERCISE  TOLERANCE  1-2 FLIGHT OF STAIRS  WITHOUT SHORTNESS OF BREATH  denies travel outside the [place] in the past 30 days  pt denies any covid s/s,   + covid april 14/2020, has had 3 negative test afterward  pt advised self quarantine till day of procedure  Anesthesia Alert  NO--Difficult Airway  NO--History of neck surgery or radiation  NO--Limited ROM of neck  NO--History of Malignant hyperthermia  NO--No personal or family history of Pseudocholinesterase deficiency.  NO--Prior Anesthesia Complication  NO--Latex Allergy  NO--Loose teeth  NO--History of Rheumatoid Arthritis  +-RAYMUNDO  NO--Other_____      MRI CARDIAC SEDATION    ^MRI CARDIAC SEDATION    Elevated triglycerides with high cholesterol    Sleep apnea    HTN (hypertension)    No significant past surgical history    SysAdmin_VisitLink

## 2020-11-24 NOTE — H&P PST ADULT - NSICDXPASTMEDICALHX_GEN_ALL_CORE_FT
PAST MEDICAL HISTORY:  Elevated triglycerides with high cholesterol     HTN (hypertension)     Sleep apnea     SVT (supraventricular tachycardia)

## 2020-11-30 ENCOUNTER — NON-APPOINTMENT (OUTPATIENT)
Age: 50
End: 2020-11-30

## 2020-11-30 PROBLEM — I47.1 SUPRAVENTRICULAR TACHYCARDIA: Chronic | Status: ACTIVE | Noted: 2020-11-24

## 2020-12-09 ENCOUNTER — OUTPATIENT (OUTPATIENT)
Dept: OUTPATIENT SERVICES | Facility: HOSPITAL | Age: 50
LOS: 1 days | Discharge: HOME | End: 2020-12-09

## 2020-12-09 ENCOUNTER — LABORATORY RESULT (OUTPATIENT)
Age: 50
End: 2020-12-09

## 2020-12-09 DIAGNOSIS — Z11.59 ENCOUNTER FOR SCREENING FOR OTHER VIRAL DISEASES: ICD-10-CM

## 2020-12-11 ENCOUNTER — OUTPATIENT (OUTPATIENT)
Dept: OUTPATIENT SERVICES | Facility: HOSPITAL | Age: 50
LOS: 1 days | Discharge: HOME | End: 2020-12-11
Payer: COMMERCIAL

## 2020-12-11 ENCOUNTER — RESULT REVIEW (OUTPATIENT)
Age: 50
End: 2020-12-11

## 2020-12-11 DIAGNOSIS — I47.2 VENTRICULAR TACHYCARDIA: ICD-10-CM

## 2020-12-11 PROCEDURE — 75561 CARDIAC MRI FOR MORPH W/DYE: CPT | Mod: 26

## 2020-12-11 NOTE — PRE-ANESTHESIA EVALUATION ADULT - NSANTHPMHFT_GEN_ALL_CORE
pt is a     has had a negative echo, negative cath, and still with svt on Holter    due to nature of his work, he is being required to get further testing

## 2020-12-11 NOTE — PRE-ANESTHESIA EVALUATION ADULT - NSANTHADDINFOFT_GEN_ALL_CORE
ROSIO required for breathholds for this cardiac MRI  pt is claustrophobic and unable to do this study on his own

## 2020-12-16 ENCOUNTER — NON-APPOINTMENT (OUTPATIENT)
Age: 50
End: 2020-12-16

## 2020-12-21 ENCOUNTER — TRANSCRIPTION ENCOUNTER (OUTPATIENT)
Age: 50
End: 2020-12-21

## 2020-12-22 ENCOUNTER — APPOINTMENT (OUTPATIENT)
Dept: CARDIOLOGY | Facility: CLINIC | Age: 50
End: 2020-12-22
Payer: COMMERCIAL

## 2020-12-22 ENCOUNTER — OUTPATIENT (OUTPATIENT)
Dept: OUTPATIENT SERVICES | Facility: HOSPITAL | Age: 50
LOS: 1 days | Discharge: HOME | End: 2020-12-22
Payer: COMMERCIAL

## 2020-12-22 VITALS
TEMPERATURE: 97.7 F | SYSTOLIC BLOOD PRESSURE: 123 MMHG | WEIGHT: 227 LBS | HEIGHT: 69 IN | BODY MASS INDEX: 33.62 KG/M2 | HEART RATE: 99 BPM | DIASTOLIC BLOOD PRESSURE: 77 MMHG

## 2020-12-22 DIAGNOSIS — J18.1 LOBAR PNEUMONIA, UNSPECIFIED ORGANISM: ICD-10-CM

## 2020-12-22 PROCEDURE — 71046 X-RAY EXAM CHEST 2 VIEWS: CPT | Mod: 26

## 2020-12-22 PROCEDURE — 93000 ELECTROCARDIOGRAM COMPLETE: CPT

## 2020-12-22 PROCEDURE — 99072 ADDL SUPL MATRL&STAF TM PHE: CPT

## 2020-12-22 PROCEDURE — 99214 OFFICE O/P EST MOD 30 MIN: CPT

## 2020-12-22 RX ORDER — SYRINGE WITH NEEDLE, 1 ML 25GX5/8"
21G X 1" SYRINGE, EMPTY DISPOSABLE MISCELLANEOUS
Qty: 14 | Refills: 5 | Status: COMPLETED | COMMUNITY
Start: 2020-11-16 | End: 2020-12-22

## 2020-12-22 RX ORDER — FENOFIBRIC ACID 45 MG/1
45 CAPSULE, DELAYED RELEASE ORAL
Refills: 0 | Status: COMPLETED | COMMUNITY
End: 2020-12-22

## 2020-12-30 PROBLEM — J18.1 CONSOLIDATION OF RIGHT LOWER LOBE OF LUNG: Status: ACTIVE | Noted: 2020-12-16

## 2020-12-30 NOTE — PHYSICAL EXAM
[General Appearance - Well Developed] : well developed [Normal Appearance] : normal appearance [Well Groomed] : well groomed [General Appearance - Well Nourished] : well nourished [No Deformities] : no deformities [General Appearance - In No Acute Distress] : no acute distress [Normal Conjunctiva] : the conjunctiva exhibited no abnormalities [Eyelids - No Xanthelasma] : the eyelids demonstrated no xanthelasmas [Normal Oral Mucosa] : normal oral mucosa [No Oral Pallor] : no oral pallor [No Oral Cyanosis] : no oral cyanosis [Normal Jugular Venous A Waves Present] : normal jugular venous A waves present [Normal Jugular Venous V Waves Present] : normal jugular venous V waves present [No Jugular Venous Desir A Waves] : no jugular venous desir A waves [Respiration, Rhythm And Depth] : normal respiratory rhythm and effort [Exaggerated Use Of Accessory Muscles For Inspiration] : no accessory muscle use [Auscultation Breath Sounds / Voice Sounds] : lungs were clear to auscultation bilaterally [Heart Rate And Rhythm] : heart rate and rhythm were normal [Heart Sounds] : normal S1 and S2 [Murmurs] : no murmurs present [Abdomen Soft] : soft [Abdomen Tenderness] : non-tender [Abdomen Mass (___ Cm)] : no abdominal mass palpated [Abnormal Walk] : normal gait [Gait - Sufficient For Exercise Testing] : the gait was sufficient for exercise testing [Nail Clubbing] : no clubbing of the fingernails [Cyanosis, Localized] : no localized cyanosis [Petechial Hemorrhages (___cm)] : no petechial hemorrhages [Skin Color & Pigmentation] : normal skin color and pigmentation [] : no rash [No Venous Stasis] : no venous stasis [Skin Lesions] : no skin lesions [No Skin Ulcers] : no skin ulcer [No Xanthoma] : no  xanthoma was observed [Oriented To Time, Place, And Person] : oriented to person, place, and time [Affect] : the affect was normal [Mood] : the mood was normal [No Anxiety] : not feeling anxious

## 2020-12-30 NOTE — DISCUSSION/SUMMARY
[FreeTextEntry1] : Mr. Brian Gant is a pleasant 50 year-old man, MTA , with hypertension, hyperlipidemia, RAYMUNDO on CPAP, referred for tachycardia and NSVT on Holter and EST. He has no significant CAD on cath. He has normal EF on echo, and normal LV function and no LGE on CMR. He has mildly depressed RV function and dilated PA. \par \par He has sudden death Father at age of 68 during sleep;\par \par Patient has no prior syncope. \par \par BP at goal today; I recommend to continue same medications; \par \par I recommend to continue Metoprolol for NSVT.\par \par I discussed CMR findings with patient in details. I recommend HF evaluation with Dr. Reddy to assess for pulmonary hypertension.\par \par Patient is following with his PCP and pulmonary for consolidation on CXR.\par \par I recommend an EP study and induction of arrhythmias to assess for inducible arrhythmias, aberrancy, and presence of bypass tract. If EP study is negative, I will proceed with ILR implant.\par \par A description of the procedure and alternatives were discussed at length. He was informed that the procedure would be performed under moderate sedation and the procedure duration is about 1-2 hours. We also discussed possible complications, which include but are not limited to groin complications, bleeding, vascular injury, perforation, arrhythmias, AV block and the need for permanent pacemaker, cardiac tamponade, need for surgery, and rare risks of stroke/heart attack/death.\par \par Procedure will be planned on 2/4/2021. 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 He expressed agreement in proceeding with EP study and ILR implant. My  will be in contact with him for finalizing date, preadmission testing and instructions. Patient will follow with me in 2 months’ time. Please do not hesitate to contact me at [phone] if you have any further questions regarding this patient care.\par

## 2020-12-30 NOTE — HISTORY OF PRESENT ILLNESS
[FreeTextEntry1] : Referring Cardiologist: Dr. Sylvain Asher\par Referring Internist: Dr. Compa Bob\par \par Hypertension, hyperlipidemia, RAYMUNDO on CPAP\par MTA ; had tachycardia on Physical\par referred for cardiac work-up: Holter showed 3 beats NSVT\par EST showed NSVT on exertion\par Father sudden death during sleep at age 68\par CMR showed dilated pulmonary artery\par MCOT showed NSVT 14 seconds, 153 bpm (11/12/2020 at 2:11 am)\par Patient has no prior syncope, no palpitations; He has no chest pain, no shortness of breath, no dyspnea on exertion, no orthopnea, no PND. He denies dizziness, lightheadedness and syncope. He has no exertional symptoms. He presents for evaluation.\par \par CARDIAC TESTING:\par ECG (12/22/2020): sinus rhythm at 99 bpm, no significant ST/T abnormalities\par ECG (10/27/2020): sinus rhythm at 87 bpm, no significant ST/T abnormalities\par Cardiac Cath (7/13/2020): minor irregularities\par Cardiac MRI (12/11/2020): Normal EF, no LGE, Pulmonary hypertension, Consolidation possible pneumonia;\par MCOT (10/27/2020 to 11/25/2020): Duration: 30 days, Average HR 88 bpm, ( bpm), EVENT: \par  -no AF, no SVT, no pause, no AV block\par  -NSVT 14 seconds, 153 bpm (11/12/2020 at 2:11 am)\par

## 2021-01-21 ENCOUNTER — RESULT REVIEW (OUTPATIENT)
Age: 51
End: 2021-01-21

## 2021-01-21 ENCOUNTER — OUTPATIENT (OUTPATIENT)
Dept: OUTPATIENT SERVICES | Facility: HOSPITAL | Age: 51
LOS: 1 days | Discharge: HOME | End: 2021-01-21
Payer: COMMERCIAL

## 2021-01-21 VITALS
HEIGHT: 69 IN | WEIGHT: 234.35 LBS | OXYGEN SATURATION: 97 % | SYSTOLIC BLOOD PRESSURE: 129 MMHG | DIASTOLIC BLOOD PRESSURE: 86 MMHG | TEMPERATURE: 100 F | HEART RATE: 78 BPM | RESPIRATION RATE: 14 BRPM

## 2021-01-21 DIAGNOSIS — Z01.818 ENCOUNTER FOR OTHER PREPROCEDURAL EXAMINATION: ICD-10-CM

## 2021-01-21 DIAGNOSIS — I47.2 VENTRICULAR TACHYCARDIA: ICD-10-CM

## 2021-01-21 LAB
ALBUMIN SERPL ELPH-MCNC: 4.8 G/DL — SIGNIFICANT CHANGE UP (ref 3.5–5.2)
ALP SERPL-CCNC: 65 U/L — SIGNIFICANT CHANGE UP (ref 30–115)
ALT FLD-CCNC: 38 U/L — SIGNIFICANT CHANGE UP (ref 0–41)
ANION GAP SERPL CALC-SCNC: 13 MMOL/L — SIGNIFICANT CHANGE UP (ref 7–14)
APPEARANCE UR: CLEAR — SIGNIFICANT CHANGE UP
APTT BLD: 30.6 SEC — SIGNIFICANT CHANGE UP (ref 27–39.2)
AST SERPL-CCNC: 53 U/L — HIGH (ref 0–41)
BASOPHILS # BLD AUTO: 0.05 K/UL — SIGNIFICANT CHANGE UP (ref 0–0.2)
BASOPHILS NFR BLD AUTO: 0.5 % — SIGNIFICANT CHANGE UP (ref 0–1)
BILIRUB SERPL-MCNC: 0.4 MG/DL — SIGNIFICANT CHANGE UP (ref 0.2–1.2)
BILIRUB UR-MCNC: NEGATIVE — SIGNIFICANT CHANGE UP
BUN SERPL-MCNC: 29 MG/DL — HIGH (ref 10–20)
CALCIUM SERPL-MCNC: 9.7 MG/DL — SIGNIFICANT CHANGE UP (ref 8.5–10.1)
CHLORIDE SERPL-SCNC: 102 MMOL/L — SIGNIFICANT CHANGE UP (ref 98–110)
CO2 SERPL-SCNC: 24 MMOL/L — SIGNIFICANT CHANGE UP (ref 17–32)
COLOR SPEC: YELLOW — SIGNIFICANT CHANGE UP
CREAT SERPL-MCNC: 1.5 MG/DL — SIGNIFICANT CHANGE UP (ref 0.7–1.5)
DIFF PNL FLD: NEGATIVE — SIGNIFICANT CHANGE UP
EOSINOPHIL # BLD AUTO: 0.45 K/UL — SIGNIFICANT CHANGE UP (ref 0–0.7)
EOSINOPHIL NFR BLD AUTO: 4.3 % — SIGNIFICANT CHANGE UP (ref 0–8)
GLUCOSE SERPL-MCNC: 85 MG/DL — SIGNIFICANT CHANGE UP (ref 70–99)
GLUCOSE UR QL: NEGATIVE — SIGNIFICANT CHANGE UP
HCT VFR BLD CALC: 38.6 % — LOW (ref 42–52)
HGB BLD-MCNC: 13 G/DL — LOW (ref 14–18)
IMM GRANULOCYTES NFR BLD AUTO: 0.6 % — HIGH (ref 0.1–0.3)
INR BLD: 1.03 RATIO — SIGNIFICANT CHANGE UP (ref 0.65–1.3)
KETONES UR-MCNC: NEGATIVE — SIGNIFICANT CHANGE UP
LEUKOCYTE ESTERASE UR-ACNC: NEGATIVE — SIGNIFICANT CHANGE UP
LYMPHOCYTES # BLD AUTO: 18.9 % — LOW (ref 20.5–51.1)
LYMPHOCYTES # BLD AUTO: 2 K/UL — SIGNIFICANT CHANGE UP (ref 1.2–3.4)
MCHC RBC-ENTMCNC: 29.9 PG — SIGNIFICANT CHANGE UP (ref 27–31)
MCHC RBC-ENTMCNC: 33.7 G/DL — SIGNIFICANT CHANGE UP (ref 32–37)
MCV RBC AUTO: 88.7 FL — SIGNIFICANT CHANGE UP (ref 80–94)
MONOCYTES # BLD AUTO: 0.91 K/UL — HIGH (ref 0.1–0.6)
MONOCYTES NFR BLD AUTO: 8.6 % — SIGNIFICANT CHANGE UP (ref 1.7–9.3)
NEUTROPHILS # BLD AUTO: 7.11 K/UL — HIGH (ref 1.4–6.5)
NEUTROPHILS NFR BLD AUTO: 67.1 % — SIGNIFICANT CHANGE UP (ref 42.2–75.2)
NITRITE UR-MCNC: NEGATIVE — SIGNIFICANT CHANGE UP
NRBC # BLD: 0 /100 WBCS — SIGNIFICANT CHANGE UP (ref 0–0)
PH UR: 5.5 — SIGNIFICANT CHANGE UP (ref 5–8)
PLATELET # BLD AUTO: 194 K/UL — SIGNIFICANT CHANGE UP (ref 130–400)
POTASSIUM SERPL-MCNC: 4.3 MMOL/L — SIGNIFICANT CHANGE UP (ref 3.5–5)
POTASSIUM SERPL-SCNC: 4.3 MMOL/L — SIGNIFICANT CHANGE UP (ref 3.5–5)
PROT SERPL-MCNC: 7.2 G/DL — SIGNIFICANT CHANGE UP (ref 6–8)
PROT UR-MCNC: NEGATIVE — SIGNIFICANT CHANGE UP
PROTHROM AB SERPL-ACNC: 11.8 SEC — SIGNIFICANT CHANGE UP (ref 9.95–12.87)
RBC # BLD: 4.35 M/UL — LOW (ref 4.7–6.1)
RBC # FLD: 13.2 % — SIGNIFICANT CHANGE UP (ref 11.5–14.5)
SODIUM SERPL-SCNC: 139 MMOL/L — SIGNIFICANT CHANGE UP (ref 135–146)
SP GR SPEC: 1.02 — SIGNIFICANT CHANGE UP (ref 1.01–1.03)
UROBILINOGEN FLD QL: SIGNIFICANT CHANGE UP
WBC # BLD: 10.58 K/UL — SIGNIFICANT CHANGE UP (ref 4.8–10.8)
WBC # FLD AUTO: 10.58 K/UL — SIGNIFICANT CHANGE UP (ref 4.8–10.8)

## 2021-01-21 PROCEDURE — 93010 ELECTROCARDIOGRAM REPORT: CPT

## 2021-01-21 PROCEDURE — 71046 X-RAY EXAM CHEST 2 VIEWS: CPT | Mod: 26

## 2021-01-21 NOTE — H&P PST ADULT - REASON FOR ADMISSION
PT PRESENTS TO PAST WITH NO SOB, CP, PALPITATIONS, DYSURIA, UTI OR URI AT PRESENT.   PT ABLE TO WALK UP 2-3 FLIGHTS OF STEPS WITH NO SOB.  AS PER THE PT, THIS IS HIS/HER COMPLETE MEDICAL AND SURGICAL HX, INCLUDING MEDICATIONS PRESCRIBED AND OVER THE COUNTER  pt denies any covid s/s, or tested positive in the past  pt advised self quarantine till day of procedure  denies travel outside the [place] in the past 30 days  Anesthesia Alert  NO--Difficult Airway  NO--History of neck surgery or radiation  NO--Limited ROM of neck  NO--History of Malignant hyperthermia  NO--Personal or family history of Pseudocholinesterase deficiency  NO--Prior Anesthesia Complication  NO--Latex Allergy  NO--Loose teeth  NO--History of Rheumatoid Arthritis  yes   --RAYMUNDO  NO--Other_____  PT SCHEDULED FOR LOOP RECORDER  IMPLANT.   PT HAS H/O- SVT AND IRREGULAR BP FOR 9 MONTHS.

## 2021-01-22 LAB — MRSA PCR RESULT.: NEGATIVE — SIGNIFICANT CHANGE UP

## 2021-01-26 ENCOUNTER — APPOINTMENT (OUTPATIENT)
Dept: CARDIOLOGY | Facility: CLINIC | Age: 51
End: 2021-01-26
Payer: COMMERCIAL

## 2021-01-26 ENCOUNTER — APPOINTMENT (OUTPATIENT)
Dept: CARDIOLOGY | Facility: CLINIC | Age: 51
End: 2021-01-26

## 2021-01-26 VITALS
SYSTOLIC BLOOD PRESSURE: 138 MMHG | HEART RATE: 106 BPM | HEIGHT: 69 IN | TEMPERATURE: 97.8 F | DIASTOLIC BLOOD PRESSURE: 82 MMHG | WEIGHT: 238 LBS | BODY MASS INDEX: 35.25 KG/M2

## 2021-01-26 DIAGNOSIS — Z82.49 FAMILY HISTORY OF ISCHEMIC HEART DISEASE AND OTHER DISEASES OF THE CIRCULATORY SYSTEM: ICD-10-CM

## 2021-01-26 DIAGNOSIS — I27.20 PULMONARY HYPERTENSION, UNSPECIFIED: ICD-10-CM

## 2021-01-26 PROCEDURE — 99072 ADDL SUPL MATRL&STAF TM PHE: CPT

## 2021-01-26 PROCEDURE — 99204 OFFICE O/P NEW MOD 45 MIN: CPT

## 2021-01-26 PROCEDURE — 93000 ELECTROCARDIOGRAM COMPLETE: CPT

## 2021-01-28 PROBLEM — Z82.49 FAMILY HISTORY OF MYOCARDIAL INFARCTION: Status: ACTIVE | Noted: 2021-01-28

## 2021-01-29 PROBLEM — I27.20 PULMONARY HYPERTENSION: Status: ACTIVE | Noted: 2021-01-26

## 2021-01-29 NOTE — ASSESSMENT
[FreeTextEntry1] : The patient is a 49 y/o male with a PMH of RAYMUNDO, Hyperlipidemia. Patient is being followed by Dr. Walters for tachycardia and NSVT, for which he will undergo for a loop recorder next month. \par

## 2021-01-29 NOTE — HISTORY OF PRESENT ILLNESS
[FreeTextEntry1] : The patient is a 49 y/o male with a PMH of RAYMUNDO, Hyperlipidemia. Patient is being followed by Dr. Walters for tachycardia and NSVT, for which he will undergo for a loop recorder next month. As part of the EP work up an MRI was done which showed dilation of pulmonary artery. A left heart cath in 07/2020 showed no obstructive CAD with normal LVEDP. No reported pulmonary HTN on echocardiogram from general cardiologist's office. \par \par Patient states he has not have any symptoms of SOB with physical activity. He denies CP, SOB at rest, PND, abdominal discomfort, LH/dizziness, palpitations, syncope, bleeding, no musculoskeletal pain.

## 2021-02-01 ENCOUNTER — OUTPATIENT (OUTPATIENT)
Dept: OUTPATIENT SERVICES | Facility: HOSPITAL | Age: 51
LOS: 1 days | Discharge: HOME | End: 2021-02-01

## 2021-02-01 ENCOUNTER — LABORATORY RESULT (OUTPATIENT)
Age: 51
End: 2021-02-01

## 2021-02-01 ENCOUNTER — APPOINTMENT (OUTPATIENT)
Dept: CARDIOLOGY | Facility: CLINIC | Age: 51
End: 2021-02-01

## 2021-02-01 DIAGNOSIS — Z11.59 ENCOUNTER FOR SCREENING FOR OTHER VIRAL DISEASES: ICD-10-CM

## 2021-02-04 ENCOUNTER — OUTPATIENT (OUTPATIENT)
Dept: OUTPATIENT SERVICES | Facility: HOSPITAL | Age: 51
LOS: 1 days | Discharge: HOME | End: 2021-02-04
Payer: COMMERCIAL

## 2021-02-04 DIAGNOSIS — I47.2 VENTRICULAR TACHYCARDIA: ICD-10-CM

## 2021-02-04 PROCEDURE — 33285 INSJ SUBQ CAR RHYTHM MNTR: CPT

## 2021-02-04 RX ADMIN — Medication 1 TABLET(S): at 12:02
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
## 2021-02-04 NOTE — CHART NOTE - NSCHARTNOTEFT_GEN_A_CORE
Electrophysiology Brief Post-Operative Note    I have personally seen and examined the patient.  I agree with the history and physical which I have reviewed and noted any changes below.      PRE-OP DIAGNOSIS:  - Wide complex tachycardia    POST-OP DIAGNOSIS:  - Wide complex tachycardia      PROCEDURE: Implant of Implantable Loop Recorder     Physician: MICHAEL Walters MD  Assistant: None    ANESTHESIA TYPE:  [  ]General Anesthesia  [  ] Sedation  [X] Local    CONDITION  [  ] Critical  [  ] Serious  [  ]Fair  [X]Good    SPECIMENS REMOVED (IF APPLICABLE): None    IMPLANTS (IF APPLICABLE)  Implantable Loop Recorder    FINDINGS (see below)  -Successful implantation of ILR (Implantable Loop Recorder)  -No immediate complications  -ESTIMATED BLOOD LOSS: 1 mL    PLAN OF CARE  -Outpatient follow-up with EP in 4-6 weeks          [facility] [place] ([address])          [place] [address] [phone]

## 2021-02-11 DIAGNOSIS — G47.33 OBSTRUCTIVE SLEEP APNEA (ADULT) (PEDIATRIC): ICD-10-CM

## 2021-02-11 DIAGNOSIS — R00.0 TACHYCARDIA, UNSPECIFIED: ICD-10-CM

## 2021-02-11 DIAGNOSIS — Z88.0 ALLERGY STATUS TO PENICILLIN: ICD-10-CM

## 2021-02-11 DIAGNOSIS — I10 ESSENTIAL (PRIMARY) HYPERTENSION: ICD-10-CM

## 2021-02-11 DIAGNOSIS — E78.00 PURE HYPERCHOLESTEROLEMIA, UNSPECIFIED: ICD-10-CM

## 2021-02-24 ENCOUNTER — APPOINTMENT (OUTPATIENT)
Dept: CARDIOLOGY | Facility: CLINIC | Age: 51
End: 2021-02-24
Payer: COMMERCIAL

## 2021-02-24 PROCEDURE — 93306 TTE W/DOPPLER COMPLETE: CPT

## 2021-02-24 PROCEDURE — 99072 ADDL SUPL MATRL&STAF TM PHE: CPT

## 2021-03-11 ENCOUNTER — APPOINTMENT (OUTPATIENT)
Dept: CARDIOLOGY | Facility: CLINIC | Age: 51
End: 2021-03-11
Payer: COMMERCIAL

## 2021-03-11 ENCOUNTER — NON-APPOINTMENT (OUTPATIENT)
Age: 51
End: 2021-03-11

## 2021-03-11 PROCEDURE — G2066: CPT

## 2021-03-11 PROCEDURE — 93298 REM INTERROG DEV EVAL SCRMS: CPT

## 2021-03-16 ENCOUNTER — APPOINTMENT (OUTPATIENT)
Dept: CARDIOLOGY | Facility: CLINIC | Age: 51
End: 2021-03-16
Payer: COMMERCIAL

## 2021-03-16 ENCOUNTER — RX RENEWAL (OUTPATIENT)
Age: 51
End: 2021-03-16

## 2021-03-16 VITALS
HEART RATE: 97 BPM | HEIGHT: 69 IN | SYSTOLIC BLOOD PRESSURE: 139 MMHG | WEIGHT: 238 LBS | DIASTOLIC BLOOD PRESSURE: 82 MMHG | BODY MASS INDEX: 35.25 KG/M2 | TEMPERATURE: 98 F

## 2021-03-16 DIAGNOSIS — Z48.89 ENCOUNTER FOR OTHER SPECIFIED SURGICAL AFTERCARE: ICD-10-CM

## 2021-03-16 PROCEDURE — 99072 ADDL SUPL MATRL&STAF TM PHE: CPT

## 2021-03-16 PROCEDURE — 93291 INTERROG DEV EVAL SCRMS IP: CPT

## 2021-03-16 PROCEDURE — 99212 OFFICE O/P EST SF 10 MIN: CPT

## 2021-03-16 NOTE — PHYSICAL EXAM
[General Appearance - Well Developed] : well developed [Normal Appearance] : normal appearance [Well Groomed] : well groomed [General Appearance - Well Nourished] : well nourished [No Deformities] : no deformities [General Appearance - In No Acute Distress] : no acute distress [Heart Rate And Rhythm] : heart rate and rhythm were normal [Heart Sounds] : normal S1 and S2 [Murmurs] : no murmurs present [Respiration, Rhythm And Depth] : normal respiratory rhythm and effort [Exaggerated Use Of Accessory Muscles For Inspiration] : no accessory muscle use [Auscultation Breath Sounds / Voice Sounds] : lungs were clear to auscultation bilaterally [Abdomen Soft] : soft [Abdomen Tenderness] : non-tender [Abdomen Mass (___ Cm)] : no abdominal mass palpated [Nail Clubbing] : no clubbing of the fingernails [Cyanosis, Localized] : no localized cyanosis [Petechial Hemorrhages (___cm)] : no petechial hemorrhages [Normal Conjunctiva] : the conjunctiva exhibited no abnormalities [Eyelids - No Xanthelasma] : the eyelids demonstrated no xanthelasmas [Normal Oral Mucosa] : normal oral mucosa [No Oral Pallor] : no oral pallor [No Oral Cyanosis] : no oral cyanosis [Normal Jugular Venous A Waves Present] : normal jugular venous A waves present [Normal Jugular Venous V Waves Present] : normal jugular venous V waves present [No Jugular Venous Desir A Waves] : no jugular venous desir A waves [Abnormal Walk] : normal gait [Gait - Sufficient For Exercise Testing] : the gait was sufficient for exercise testing [Skin Color & Pigmentation] : normal skin color and pigmentation [] : no rash [No Venous Stasis] : no venous stasis [Skin Lesions] : no skin lesions [No Skin Ulcers] : no skin ulcer [No Xanthoma] : no  xanthoma was observed [Oriented To Time, Place, And Person] : oriented to person, place, and time [Affect] : the affect was normal [Mood] : the mood was normal [No Anxiety] : not feeling anxious

## 2021-03-21 PROBLEM — Z48.89 ENCOUNTER FOR POSTOPERATIVE WOUND CHECK: Status: ACTIVE | Noted: 2021-03-21

## 2021-03-21 NOTE — HISTORY OF PRESENT ILLNESS
[FreeTextEntry1] : Referring Cardiologist: Dr. Sylvain Asher\par Referring Internist: Dr. Compa Bob\par \par Hypertension, hyperlipidemia, RAYMUNDO on CPAP\par MTA ; had tachycardia on Physical\par referred for cardiac work-up: Holter showed 3 beats NSVT\par EST showed NSVT on exertion\par Father sudden death during sleep at age 68\par CMR showed dilated pulmonary artery\par MCOT showed NSVT 14 seconds, 153 bpm (11/12/2020 at 2:11 am)\par Patient has no prior syncope, no palpitations; He has no chest pain, no shortness of breath, no dyspnea on exertion, no orthopnea, no PND. He denies dizziness, lightheadedness and syncope. He has no exertional symptoms. \par On 2/4/2021 An ILR was implanted for long term monitoring of arrhythmias. \par Presents for wound check and device interrogation\par \par CARDIAC TESTING:\par ECG ( 3/16/2021) sinus rhythm @ 97bpm\par ECG (12/22/2020): sinus rhythm at 99 bpm, no significant ST/T abnormalities\par ECG (10/27/2020): sinus rhythm at 87 bpm, no significant ST/T abnormalities\par Cardiac Cath (7/13/2020): minor irregularities\par Cardiac MRI (12/11/2020): Normal EF, no LGE, Pulmonary hypertension, Consolidation possible pneumonia;\par MCOT (10/27/2020 to 11/25/2020): Duration: 30 days, Average HR 88 bpm, ( bpm), EVENT: \par  -no AF, no SVT, no pause, no AV block\par  -NSVT 14 seconds, 153 bpm (11/12/2020 at 2:11 am)\par

## 2021-03-21 NOTE — DISCUSSION/SUMMARY
[FreeTextEntry1] : Mr. Brian Gant is a pleasant 50 year-old man, MTA , with hypertension, hyperlipidemia, RAYMUNDO on CPAP, referred for tachycardia and NSVT on Holter and EST. He has no significant CAD on cath. He has normal EF on echo, and normal LV function and no LGE on CMR. He has mildly depressed RV function and dilated PA. \par \par He has sudden death Father at age of 68 during sleep;\par Patient has no prior syncope\par \par I recommend to continue Metoprolol for NSVT.\par \par I discussed CMR findings with patient in details. I recommend HF evaluation with Dr. Reddy to assess for pulmonary hypertension.\par \par Patient is following with his PCP and pulmonary for consolidation on CXR.\par \par I recommend an EP study and induction of arrhythmias to assess for inducible arrhythmias, aberrancy, and presence of bypass tract. 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 Patient will follow with Dr. Walters in 3 months’ time. Please do not hesitate to contact me at [phone] if you have any further questions regarding this patient care.\par

## 2021-03-21 NOTE — ASSESSMENT
[FreeTextEntry1] : 50 years old MTA  with h/o NSVT  with no h/o syncope.\par \par s/p ILR implant, here for post op follow up\par \par #wound check - para sternal incision has healed well\par \par # Device interrogation - 1 episode of 13 sec ST @ 150 bpm - patient report he was walking, up and about the house.\par \par # Remote monitoring- he is enrolled\par Remote monitoring was discussed with patient, schedule, process,  as well as associated co-pay that may not covered by their insurance.\par \par # NSVT/tachy\par c/w metoprolol\par \par

## 2021-03-21 NOTE — PROCEDURE
[No] : not [NSR] : normal sinus rhythm [Normal] : The battery status is normal. [Sensing Amplitude ___mv] : sensing amplitude was [unfilled] mv [None] : none [de-identified] : Medtronic [de-identified] : LNQ11 [de-identified] : [id number] [de-identified] : 2/4/21 [de-identified] : "good" [de-identified] : 1 episode of tachy on 3/11/21 at 7am for 13 sec\par implanted for palpitations

## 2021-03-21 NOTE — REASON FOR VISIT
[Follow-up Device Check] : is here today for a follow-up device check visit for [Follow-Up - Clinic] : a clinic follow-up of [FreeTextEntry1] : NSVT

## 2021-03-26 ENCOUNTER — TRANSCRIPTION ENCOUNTER (OUTPATIENT)
Age: 51
End: 2021-03-26

## 2021-03-31 ENCOUNTER — APPOINTMENT (OUTPATIENT)
Dept: CARDIOLOGY | Facility: CLINIC | Age: 51
End: 2021-03-31
Payer: COMMERCIAL

## 2021-03-31 VITALS
TEMPERATURE: 97.9 F | BODY MASS INDEX: 36.14 KG/M2 | WEIGHT: 244 LBS | OXYGEN SATURATION: 96 % | HEIGHT: 69 IN | HEART RATE: 103 BPM | SYSTOLIC BLOOD PRESSURE: 130 MMHG | DIASTOLIC BLOOD PRESSURE: 80 MMHG

## 2021-03-31 DIAGNOSIS — Z00.00 ENCOUNTER FOR GENERAL ADULT MEDICAL EXAMINATION W/OUT ABNORMAL FINDINGS: ICD-10-CM

## 2021-03-31 DIAGNOSIS — I28.8 OTHER DISEASES OF PULMONARY VESSELS: ICD-10-CM

## 2021-03-31 PROCEDURE — 93000 ELECTROCARDIOGRAM COMPLETE: CPT

## 2021-03-31 PROCEDURE — 99072 ADDL SUPL MATRL&STAF TM PHE: CPT

## 2021-03-31 PROCEDURE — 99213 OFFICE O/P EST LOW 20 MIN: CPT

## 2021-04-05 PROBLEM — I28.8: Status: ACTIVE | Noted: 2020-12-30

## 2021-04-05 NOTE — HISTORY OF PRESENT ILLNESS
[FreeTextEntry1] : The patient is a 49 y/o male with a PMH of RAYMUNDO, Hyperlipidemia coming for follow up. He was found to have dilated PA on MRI. No e/o pulmonary hypertension or intracardiac shunt. Patient has no significant limitation to exercise. he follows up with Dr. Walters for SVT.

## 2021-04-15 ENCOUNTER — APPOINTMENT (OUTPATIENT)
Dept: CARDIOLOGY | Facility: CLINIC | Age: 51
End: 2021-04-15
Payer: COMMERCIAL

## 2021-04-15 ENCOUNTER — NON-APPOINTMENT (OUTPATIENT)
Age: 51
End: 2021-04-15

## 2021-04-15 PROCEDURE — G2066: CPT

## 2021-04-15 PROCEDURE — 93298 REM INTERROG DEV EVAL SCRMS: CPT

## 2021-04-23 ENCOUNTER — APPOINTMENT (OUTPATIENT)
Dept: UROLOGY | Facility: CLINIC | Age: 51
End: 2021-04-23
Payer: COMMERCIAL

## 2021-04-23 VITALS — HEIGHT: 69 IN | WEIGHT: 230 LBS | TEMPERATURE: 94.2 F | BODY MASS INDEX: 34.07 KG/M2

## 2021-04-23 PROCEDURE — 99072 ADDL SUPL MATRL&STAF TM PHE: CPT

## 2021-04-23 PROCEDURE — 99214 OFFICE O/P EST MOD 30 MIN: CPT

## 2021-04-23 NOTE — ASSESSMENT
[FreeTextEntry1] : YJOTI VÁZQUEZ is a 50 year old male with a past medical history of testicular hypofunction and impotence. . Patient currently on Testosterone cypionate injections 0.5 ml, , 100 mg ,every 5 days as prescribed, with good results. Libido and energy both are good and patient happy. As per ED, patient on Viagra 100 mg as needed, with good results. Denies any urinary issues, including dysuria, hematuria, fever, or chills. \par \par April 2021\par Total T 896\par Liver enzymes -- ast 66, alt 54\par CBC -- 13.3\par \par 2/19/2020\par -PSA 0.6 ng/ml, 33 % free PSA\par -Total testosterone 762 (250-1100), Bioavailable 289.3 (110-575), testosterone free 140.7 ()\par \par 1/4/2019.- Trough\par -hemoglobin 13.4, platelets 207\par -Normal LFT\par -Total T 416 (250-1100), Bioavailable 152.9 (110-575), testosterone free 71.3 ()\par \par 1/5/2019.- Peak\par -PSA 0.6 ng/ml, 33 % Free PSA\par -Total T 599 (250-1100), Bioavailable 247.6 (110-575), testosterone free 117.9 () \par \par \par decrease testosterone to 0.25ml every 5 days\par

## 2021-04-23 NOTE — HISTORY OF PRESENT ILLNESS
[FreeTextEntry1] : JYOTI VÁZQUEZ is a 50 year old male with a past medical history of testicular hypofunction and impotence. . Patient currently on Testosterone cypionate injections 0.5 ml, , 100 mg ,every 5 days as prescribed, with good results. Libido and energy both are good and patient happy. As per ED, patient on Viagra 100 mg as needed, with good results. Denies any urinary issues, including dysuria, hematuria, fever, or chills. \par \par April 2021\par Total T 896\par Liver enzymes -- ast 66, alt 54\par CBC -- 13.3\par \par 2/19/2020\par -PSA 0.6 ng/ml, 33 % free PSA\par -Total testosterone 762 (250-1100), Bioavailable 289.3 (110-575), testosterone free 140.7 ()\par \par 1/4/2019.- Trough\par -hemoglobin 13.4, platelets 207\par -Normal LFT\par -Total T 416 (250-1100), Bioavailable 152.9 (110-575), testosterone free 71.3 ()\par \par 1/5/2019.- Peak\par -PSA 0.6 ng/ml, 33 % Free PSA\par -Total T 599 (250-1100), Bioavailable 247.6 (110-575), testosterone free 117.9 () \par \par \par decrease testosterone to 0.25ml every 5 days\par \par

## 2021-05-20 ENCOUNTER — APPOINTMENT (OUTPATIENT)
Dept: CARDIOLOGY | Facility: CLINIC | Age: 51
End: 2021-05-20
Payer: COMMERCIAL

## 2021-05-20 ENCOUNTER — NON-APPOINTMENT (OUTPATIENT)
Age: 51
End: 2021-05-20

## 2021-05-20 PROCEDURE — G2066: CPT

## 2021-05-20 PROCEDURE — 93298 REM INTERROG DEV EVAL SCRMS: CPT

## 2021-06-18 ENCOUNTER — TRANSCRIPTION ENCOUNTER (OUTPATIENT)
Age: 51
End: 2021-06-18

## 2021-06-21 ENCOUNTER — APPOINTMENT (OUTPATIENT)
Dept: CARDIOLOGY | Facility: CLINIC | Age: 51
End: 2021-06-21
Payer: COMMERCIAL

## 2021-06-21 VITALS
SYSTOLIC BLOOD PRESSURE: 126 MMHG | BODY MASS INDEX: 34.36 KG/M2 | DIASTOLIC BLOOD PRESSURE: 75 MMHG | HEIGHT: 69 IN | TEMPERATURE: 97 F | WEIGHT: 232 LBS

## 2021-06-21 PROCEDURE — 93000 ELECTROCARDIOGRAM COMPLETE: CPT | Mod: 59

## 2021-06-21 PROCEDURE — 99213 OFFICE O/P EST LOW 20 MIN: CPT

## 2021-06-21 PROCEDURE — 99072 ADDL SUPL MATRL&STAF TM PHE: CPT

## 2021-06-21 PROCEDURE — 93291 INTERROG DEV EVAL SCRMS IP: CPT

## 2021-06-21 RX ORDER — METOPROLOL TARTRATE 25 MG/1
25 TABLET, FILM COATED ORAL
Qty: 180 | Refills: 3 | Status: COMPLETED | COMMUNITY
Start: 1900-01-01 | End: 2021-06-21

## 2021-06-21 NOTE — REASON FOR VISIT
[___ Device Check] : is here today for a [unfilled] device check for [Follow-Up - Clinic] : a clinic follow-up of [Arrhythmias (seen on stored data)] : arrhythmias seen on stored data [FreeTextEntry1] : NSVT

## 2021-06-21 NOTE — PHYSICAL EXAM
[General Appearance - Well Developed] : well developed [Normal Appearance] : normal appearance [Well Groomed] : well groomed [General Appearance - Well Nourished] : well nourished [No Deformities] : no deformities [General Appearance - In No Acute Distress] : no acute distress [Heart Rate And Rhythm] : heart rate and rhythm were normal [Heart Sounds] : normal S1 and S2 [Murmurs] : no murmurs present [Respiration, Rhythm And Depth] : normal respiratory rhythm and effort [Auscultation Breath Sounds / Voice Sounds] : lungs were clear to auscultation bilaterally [Exaggerated Use Of Accessory Muscles For Inspiration] : no accessory muscle use [Abdomen Soft] : soft [Nail Clubbing] : no clubbing of the fingernails [Cyanosis, Localized] : no localized cyanosis [Petechial Hemorrhages (___cm)] : no petechial hemorrhages [Normal Conjunctiva] : the conjunctiva exhibited no abnormalities [Eyelids - No Xanthelasma] : the eyelids demonstrated no xanthelasmas [Abnormal Walk] : normal gait [Gait - Sufficient For Exercise Testing] : the gait was sufficient for exercise testing [Skin Color & Pigmentation] : normal skin color and pigmentation [] : no rash [No Venous Stasis] : no venous stasis [Skin Lesions] : no skin lesions [No Skin Ulcers] : no skin ulcer [No Xanthoma] : no  xanthoma was observed [Oriented To Time, Place, And Person] : oriented to person, place, and time [Affect] : the affect was normal [Mood] : the mood was normal [No Anxiety] : not feeling anxious [Clean] : clean [Dry] : dry [Well-Healed] : well-healed [Normal Oral Mucosa] : normal oral mucosa [FreeTextEntry1] : No JVD

## 2021-06-21 NOTE — PROCEDURE
[No] : not [NSR] : normal sinus rhythm [Normal] : The battery status is normal. [Sensing Amplitude ___mv] : sensing amplitude was [unfilled] mv [None] : none [de-identified] : Medtronic [de-identified] : [id number] [de-identified] : LNQ11 [de-identified] : 2/4/21 [de-identified] : "good" [de-identified] : 25 tachy episodes all consistent with sinus tach rate 150 bpm longest lasting 3 mins and 53 seconds\par 1 tachy episode with sudden onset lasting 12 seconds, V rate 182 bpm

## 2021-06-21 NOTE — DISCUSSION/SUMMARY
[FreeTextEntry1] : Mr. Brian Gant is a pleasant 50 year-old man, [employer] , with hypertension, hyperlipidemia, RAYMUNDO on CPAP, referred for tachycardia and NSVT on Holter and EST. He has no significant CAD on cath. He has normal EF on echo, and normal LV function and no LGE on CMR. He has mildly depressed RV function and dilated PA. \par \par He has sudden death Father at age of 68 during sleep;\par \par Patient has no prior syncope. \par \par BP at goal today; I recommend to continue same medications; \par \par I recommend to continue Metoprolol for NSVT. Metoprolol dosage recently increased from 25mg to 50mg twice daily after ILR transmission revealed one episode of WCT suspicious for VT on 5/25/2021 during which patient was sleeping and asymptomatic. Subsequently, his amlodipine was lowered from 10mg to 5mg to allow for better blood pressure control. \par \par  I discussed treatment options with the patient with Dr Walters, including an EP study with possible ablation. We recommend an EP study and induction of arrhythmias to assess for inducible arrhythmias, aberrancy, and presence of bypass tract. \par \par A description of the procedure and alternatives were discussed at length. He was informed that the procedure would be performed under general anesthesia and the procedure duration is about 4 hours. We also discussed possible complications, which include but are not limited to groin complications, bleeding, vascular injury, perforation, arrhythmias, AV block and the need for permanent pacemaker, cardiac tamponade, need for surgery, and rare risks of stroke/heart attack/death.\par \par Procedure will be planned on 7/28/2021. 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 He expressed agreement in proceeding with EP study. To date, patient is fully COVID vaccinated. Patient will follow with me in 4-6 weeks after procedure. Please do not hesitate to contact me at [phone] if you have any further questions regarding this patient care.\par

## 2021-06-21 NOTE — HISTORY OF PRESENT ILLNESS
[FreeTextEntry1] : Referring Cardiologist: Dr. Sylvain Asher\par Referring Internist: Dr. Compa Bob\par \par Hypertension, hyperlipidemia, RAYMUNDO on CPAP\par MTA ; had tachycardia on Physical\par referred for cardiac work-up: Holter showed 3 beats NSVT\par EST showed NSVT on exertion\par Father sudden death during sleep at age 68\par CMR showed dilated pulmonary artery\par MCOT showed NSVT 14 seconds, 153 bpm (11/12/2020 at 2:11 am)\par Patient has no prior syncope, no palpitations; He has no chest pain, no shortness of breath, no dyspnea on exertion, no orthopnea, no PND. He denies dizziness, lightheadedness and syncope. He has no exertional symptoms. \par \par He presents for ILR interrogation. \par \par CARDIAC TESTING:\par ECG (6/21/2021) Sinus rhythm at 85 bpm, no significant ST/T abnormalities\par ECG (12/22/2020): sinus rhythm at 99 bpm, no significant ST/T abnormalities\par ECG (10/27/2020): sinus rhythm at 87 bpm, no significant ST/T abnormalities\par Cardiac Cath (7/13/2020): minor irregularities\par Cardiac MRI (12/11/2020): Normal EF, no LGE, Pulmonary hypertension, Consolidation possible pneumonia;\par MCOT (10/27/2020 to 11/25/2020): Duration: 30 days, Average HR 88 bpm, ( bpm), EVENT: \par  -no AF, no SVT, no pause, no AV block\par  -NSVT 14 seconds, 153 bpm (11/12/2020 at 2:11 am)\par

## 2021-06-24 ENCOUNTER — NON-APPOINTMENT (OUTPATIENT)
Age: 51
End: 2021-06-24

## 2021-06-24 ENCOUNTER — APPOINTMENT (OUTPATIENT)
Dept: CARDIOLOGY | Facility: CLINIC | Age: 51
End: 2021-06-24
Payer: COMMERCIAL

## 2021-06-24 PROCEDURE — 93298 REM INTERROG DEV EVAL SCRMS: CPT

## 2021-06-24 PROCEDURE — G2066: CPT

## 2021-07-07 ENCOUNTER — APPOINTMENT (OUTPATIENT)
Dept: UROLOGY | Facility: CLINIC | Age: 51
End: 2021-07-07
Payer: COMMERCIAL

## 2021-07-07 VITALS — WEIGHT: 230 LBS | BODY MASS INDEX: 34.07 KG/M2 | HEIGHT: 69 IN

## 2021-07-07 PROCEDURE — 99072 ADDL SUPL MATRL&STAF TM PHE: CPT

## 2021-07-07 PROCEDURE — 99214 OFFICE O/P EST MOD 30 MIN: CPT

## 2021-07-15 ENCOUNTER — OUTPATIENT (OUTPATIENT)
Dept: OUTPATIENT SERVICES | Facility: HOSPITAL | Age: 51
LOS: 1 days | Discharge: HOME | End: 2021-07-15
Payer: COMMERCIAL

## 2021-07-15 VITALS
WEIGHT: 235.67 LBS | TEMPERATURE: 98 F | DIASTOLIC BLOOD PRESSURE: 75 MMHG | HEIGHT: 69 IN | OXYGEN SATURATION: 96 % | RESPIRATION RATE: 18 BRPM | SYSTOLIC BLOOD PRESSURE: 138 MMHG | HEART RATE: 83 BPM

## 2021-07-15 DIAGNOSIS — I47.2 VENTRICULAR TACHYCARDIA: ICD-10-CM

## 2021-07-15 DIAGNOSIS — Z01.818 ENCOUNTER FOR OTHER PREPROCEDURAL EXAMINATION: ICD-10-CM

## 2021-07-15 DIAGNOSIS — Z98.890 OTHER SPECIFIED POSTPROCEDURAL STATES: Chronic | ICD-10-CM

## 2021-07-15 LAB
ALBUMIN SERPL ELPH-MCNC: 4.6 G/DL — SIGNIFICANT CHANGE UP (ref 3.5–5.2)
ALP SERPL-CCNC: 65 U/L — SIGNIFICANT CHANGE UP (ref 30–115)
ALT FLD-CCNC: 36 U/L — SIGNIFICANT CHANGE UP (ref 0–41)
ANION GAP SERPL CALC-SCNC: 13 MMOL/L — SIGNIFICANT CHANGE UP (ref 7–14)
APPEARANCE UR: CLEAR — SIGNIFICANT CHANGE UP
APTT BLD: 30.2 SEC — SIGNIFICANT CHANGE UP (ref 27–39.2)
AST SERPL-CCNC: 54 U/L — HIGH (ref 0–41)
BASOPHILS # BLD AUTO: 0.06 K/UL — SIGNIFICANT CHANGE UP (ref 0–0.2)
BASOPHILS NFR BLD AUTO: 0.8 % — SIGNIFICANT CHANGE UP (ref 0–1)
BILIRUB SERPL-MCNC: 0.4 MG/DL — SIGNIFICANT CHANGE UP (ref 0.2–1.2)
BILIRUB UR-MCNC: NEGATIVE — SIGNIFICANT CHANGE UP
BUN SERPL-MCNC: 24 MG/DL — HIGH (ref 10–20)
CALCIUM SERPL-MCNC: 9.5 MG/DL — SIGNIFICANT CHANGE UP (ref 8.5–10.1)
CHLORIDE SERPL-SCNC: 102 MMOL/L — SIGNIFICANT CHANGE UP (ref 98–110)
CO2 SERPL-SCNC: 23 MMOL/L — SIGNIFICANT CHANGE UP (ref 17–32)
COLOR SPEC: YELLOW — SIGNIFICANT CHANGE UP
CREAT SERPL-MCNC: 1.4 MG/DL — SIGNIFICANT CHANGE UP (ref 0.7–1.5)
DIFF PNL FLD: NEGATIVE — SIGNIFICANT CHANGE UP
EOSINOPHIL # BLD AUTO: 0.2 K/UL — SIGNIFICANT CHANGE UP (ref 0–0.7)
EOSINOPHIL NFR BLD AUTO: 2.8 % — SIGNIFICANT CHANGE UP (ref 0–8)
GLUCOSE SERPL-MCNC: 97 MG/DL — SIGNIFICANT CHANGE UP (ref 70–99)
GLUCOSE UR QL: NEGATIVE — SIGNIFICANT CHANGE UP
HCT VFR BLD CALC: 38.3 % — LOW (ref 42–52)
HGB BLD-MCNC: 12.9 G/DL — LOW (ref 14–18)
IMM GRANULOCYTES NFR BLD AUTO: 0.4 % — HIGH (ref 0.1–0.3)
INR BLD: 1.07 RATIO — SIGNIFICANT CHANGE UP (ref 0.65–1.3)
KETONES UR-MCNC: NEGATIVE — SIGNIFICANT CHANGE UP
LEUKOCYTE ESTERASE UR-ACNC: NEGATIVE — SIGNIFICANT CHANGE UP
LYMPHOCYTES # BLD AUTO: 1.58 K/UL — SIGNIFICANT CHANGE UP (ref 1.2–3.4)
LYMPHOCYTES # BLD AUTO: 22.3 % — SIGNIFICANT CHANGE UP (ref 20.5–51.1)
MCHC RBC-ENTMCNC: 29.8 PG — SIGNIFICANT CHANGE UP (ref 27–31)
MCHC RBC-ENTMCNC: 33.7 G/DL — SIGNIFICANT CHANGE UP (ref 32–37)
MCV RBC AUTO: 88.5 FL — SIGNIFICANT CHANGE UP (ref 80–94)
MONOCYTES # BLD AUTO: 0.8 K/UL — HIGH (ref 0.1–0.6)
MONOCYTES NFR BLD AUTO: 11.3 % — HIGH (ref 1.7–9.3)
NEUTROPHILS # BLD AUTO: 4.41 K/UL — SIGNIFICANT CHANGE UP (ref 1.4–6.5)
NEUTROPHILS NFR BLD AUTO: 62.4 % — SIGNIFICANT CHANGE UP (ref 42.2–75.2)
NITRITE UR-MCNC: NEGATIVE — SIGNIFICANT CHANGE UP
NRBC # BLD: 0 /100 WBCS — SIGNIFICANT CHANGE UP (ref 0–0)
PH UR: 6 — SIGNIFICANT CHANGE UP (ref 5–8)
PLATELET # BLD AUTO: 182 K/UL — SIGNIFICANT CHANGE UP (ref 130–400)
POTASSIUM SERPL-MCNC: 4.5 MMOL/L — SIGNIFICANT CHANGE UP (ref 3.5–5)
POTASSIUM SERPL-SCNC: 4.5 MMOL/L — SIGNIFICANT CHANGE UP (ref 3.5–5)
PROT SERPL-MCNC: 7.1 G/DL — SIGNIFICANT CHANGE UP (ref 6–8)
PROT UR-MCNC: SIGNIFICANT CHANGE UP
PROTHROM AB SERPL-ACNC: 12.3 SEC — SIGNIFICANT CHANGE UP (ref 9.95–12.87)
RBC # BLD: 4.33 M/UL — LOW (ref 4.7–6.1)
RBC # FLD: 13.5 % — SIGNIFICANT CHANGE UP (ref 11.5–14.5)
SODIUM SERPL-SCNC: 138 MMOL/L — SIGNIFICANT CHANGE UP (ref 135–146)
SP GR SPEC: 1.02 — SIGNIFICANT CHANGE UP (ref 1.01–1.03)
UROBILINOGEN FLD QL: SIGNIFICANT CHANGE UP
WBC # BLD: 7.08 K/UL — SIGNIFICANT CHANGE UP (ref 4.8–10.8)
WBC # FLD AUTO: 7.08 K/UL — SIGNIFICANT CHANGE UP (ref 4.8–10.8)

## 2021-07-15 PROCEDURE — 93010 ELECTROCARDIOGRAM REPORT: CPT

## 2021-07-15 RX ORDER — FOLIC ACID 0.8 MG
1 TABLET ORAL
Qty: 0 | Refills: 0 | DISCHARGE

## 2021-07-15 RX ORDER — METOPROLOL TARTRATE 50 MG
1 TABLET ORAL
Qty: 0 | Refills: 0 | DISCHARGE

## 2021-07-15 RX ORDER — AMLODIPINE BESYLATE 2.5 MG/1
1 TABLET ORAL
Qty: 0 | Refills: 0 | DISCHARGE

## 2021-07-15 RX ORDER — FENOFIBRATE,MICRONIZED 130 MG
1 CAPSULE ORAL
Qty: 0 | Refills: 0 | DISCHARGE

## 2021-07-15 RX ORDER — COLESEVELAM HYDROCHLORIDE 625 MG/1
3 TABLET, FILM COATED ORAL
Qty: 0 | Refills: 0 | DISCHARGE

## 2021-07-15 NOTE — H&P PST ADULT - REASON FOR ADMISSION
51 Y/O M SCHEDULED FOR PAST EP STUDY VT MAPPING VT ABLATION ON 7/28/21 WITH DR CASTILLO. 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 DENIED ANY SYMPTOMS AT THAT TIME. CURRENTLY HAS LOOP RECORDER IN AND REPORTS AT LEAST ONE EPISODE WHERE HE WAS TOLD HIS "HR WAS ELEVATED". PT DENIES FEELING ANY SYMPTOMS DURING THAT EPISODE. CURRENTLY DENIES ANY CHEST PAIN, LIGHTHEADEDNESS, BLURRY VISION.
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
## 2021-07-16 LAB
CULTURE RESULTS: SIGNIFICANT CHANGE UP
SPECIMEN SOURCE: SIGNIFICANT CHANGE UP

## 2021-07-20 NOTE — ASSESSMENT
[FreeTextEntry1] : Aquilino Gant is a 50 year old male with a past medical history of testicular hypofunction and impotence. Patient was decreased to Testosterone cypionate injections 0.25 mL every 5 days from 0.5 mL every 5 days. Patient presents to office today to review lab work and assess symptoms. \par Patient reports he feels well. Reports good libido and good energy. Patient ED is controlled with Viagra 100 mg as needed. Denies urinary complaints at this time. \par \par June 2021 blood work:\par Testosterone Total- 411 ng/dL\par SHBG- 24\par AST- 48\par ALT- 41\par Hemoglobin- 12.6 L\par \par Previous records:\par April 2021\par Total T 896\par Liver enzymes -- ast 66, alt 54\par CBC -- 13.3\par \par 2/19/2020\par -PSA 0.6 ng/ml, 33 % free PSA\par -Total testosterone 762 (250-1100), Bioavailable 289.3 (110-575), testosterone free 140.7 ()\par \par 1/4/2019.- Trough\par -hemoglobin 13.4, platelets 207\par -Normal LFT\par -Total T 416 (250-1100), Bioavailable 152.9 (110-575), testosterone free 71.3 ()\par \par 1/5/2019.- Peak\par -PSA 0.6 ng/ml, 33 % Free PSA\par -Total T 599 (250-1100), Bioavailable 247.6 (110-575), testosterone free 117.9 ()

## 2021-07-20 NOTE — HISTORY OF PRESENT ILLNESS
[FreeTextEntry1] : Aquilino Gant is a 50 year old male with a past medical history of testicular hypofunction and impotence. Patient was decreased to Testosterone cypionate injections 0.25 mL every 5 days from 0.5 mL every 5 days. Patient presents to office today to review lab work and assess symptoms. \par Patient reports he feels well. Reports good libido and good energy. Patient ED is controlled with Viagra 100 mg as needed. Denies urinary complaints at this time. \par \par June 2021 blood work:\par Testosterone Total- 411 ng/dL\par SHBG- 24\par AST- 48\par ALT- 41\par Hemoglobin- 12.6 L\par \par Previous records:\par April 2021\par Total T 896\par Liver enzymes -- ast 66, alt 54\par CBC -- 13.3\par \par 2/19/2020\par -PSA 0.6 ng/ml, 33 % free PSA\par -Total testosterone 762 (250-1100), Bioavailable 289.3 (110-575), testosterone free 140.7 ()\par \par 1/4/2019.- Trough\par -hemoglobin 13.4, platelets 207\par -Normal LFT\par -Total T 416 (250-1100), Bioavailable 152.9 (110-575), testosterone free 71.3 ()\par \par 1/5/2019.- Peak\par -PSA 0.6 ng/ml, 33 % Free PSA\par -Total T 599 (250-1100), Bioavailable 247.6 (110-575), testosterone free 117.9 () \par

## 2021-07-28 ENCOUNTER — APPOINTMENT (OUTPATIENT)
Dept: CARDIOLOGY | Facility: CLINIC | Age: 51
End: 2021-07-28
Payer: COMMERCIAL

## 2021-07-28 ENCOUNTER — INPATIENT (INPATIENT)
Facility: HOSPITAL | Age: 51
LOS: 0 days | Discharge: HOME | End: 2021-07-29
Attending: INTERNAL MEDICINE | Admitting: INTERNAL MEDICINE
Payer: COMMERCIAL

## 2021-07-28 ENCOUNTER — NON-APPOINTMENT (OUTPATIENT)
Age: 51
End: 2021-07-28

## 2021-07-28 VITALS — WEIGHT: 235.01 LBS | HEIGHT: 69 IN

## 2021-07-28 DIAGNOSIS — Z98.890 OTHER SPECIFIED POSTPROCEDURAL STATES: Chronic | ICD-10-CM

## 2021-07-28 DIAGNOSIS — I47.2 VENTRICULAR TACHYCARDIA: ICD-10-CM

## 2021-07-28 PROCEDURE — 93298 REM INTERROG DEV EVAL SCRMS: CPT

## 2021-07-28 PROCEDURE — 93623 PRGRMD STIMJ&PACG IV RX NFS: CPT | Mod: 26

## 2021-07-28 PROCEDURE — 93621 COMP EP EVL L PAC&REC C SINS: CPT | Mod: 26

## 2021-07-28 PROCEDURE — G2066: CPT

## 2021-07-28 PROCEDURE — 93620 COMP EP EVL R AT VEN PAC&REC: CPT | Mod: 26

## 2021-07-28 RX ORDER — HYDROMORPHONE HYDROCHLORIDE 2 MG/ML
1 INJECTION INTRAMUSCULAR; INTRAVENOUS; SUBCUTANEOUS
Refills: 0 | Status: DISCONTINUED | OUTPATIENT
Start: 2021-07-28 | End: 2021-07-29

## 2021-07-28 RX ORDER — MEPERIDINE HYDROCHLORIDE 50 MG/ML
12.5 INJECTION INTRAMUSCULAR; INTRAVENOUS; SUBCUTANEOUS
Refills: 0 | Status: DISCONTINUED | OUTPATIENT
Start: 2021-07-28 | End: 2021-07-29

## 2021-07-28 RX ORDER — SODIUM CHLORIDE 9 MG/ML
1000 INJECTION INTRAMUSCULAR; INTRAVENOUS; SUBCUTANEOUS
Refills: 0 | Status: DISCONTINUED | OUTPATIENT
Start: 2021-07-28 | End: 2021-07-28

## 2021-07-28 RX ORDER — HYDROMORPHONE HYDROCHLORIDE 2 MG/ML
0.5 INJECTION INTRAMUSCULAR; INTRAVENOUS; SUBCUTANEOUS
Refills: 0 | Status: DISCONTINUED | OUTPATIENT
Start: 2021-07-28 | End: 2021-07-29

## 2021-07-28 RX ORDER — VALSARTAN 80 MG/1
320 TABLET ORAL DAILY
Refills: 0 | Status: DISCONTINUED | OUTPATIENT
Start: 2021-07-28 | End: 2021-07-29

## 2021-07-28 RX ORDER — HYDROCHLOROTHIAZIDE 25 MG
25 TABLET ORAL DAILY
Refills: 0 | Status: DISCONTINUED | OUTPATIENT
Start: 2021-07-28 | End: 2021-07-29

## 2021-07-28 RX ORDER — METOPROLOL TARTRATE 50 MG
50 TABLET ORAL EVERY 12 HOURS
Refills: 0 | Status: DISCONTINUED | OUTPATIENT
Start: 2021-07-28 | End: 2021-07-29

## 2021-07-28 RX ORDER — FOLIC ACID 0.8 MG
1 TABLET ORAL DAILY
Refills: 0 | Status: DISCONTINUED | OUTPATIENT
Start: 2021-07-28 | End: 2021-07-29

## 2021-07-28 RX ORDER — AMLODIPINE BESYLATE 2.5 MG/1
5 TABLET ORAL DAILY
Refills: 0 | Status: DISCONTINUED | OUTPATIENT
Start: 2021-07-28 | End: 2021-07-29

## 2021-07-28 RX ORDER — ONDANSETRON 8 MG/1
4 TABLET, FILM COATED ORAL ONCE
Refills: 0 | Status: DISCONTINUED | OUTPATIENT
Start: 2021-07-28 | End: 2021-07-29

## 2021-07-28 RX ADMIN — Medication 50 MILLIGRAM(S): at 18:21

## 2021-07-28 NOTE — CHART NOTE - NSCHARTNOTEFT_GEN_A_CORE
Electrophysiology Brief Post-Op Note      I have personally seen and examined the patient.  I agree with the history and physical which I have reviewed and noted any changes below.      PRE-OP DIAGNOSIS: Wide complex Tachycardia    POST-OP DIAGNOSIS: Wide complex Tachycardia    PROCEDURE:  -Electrophysiology study with induction of arrhythmias  -Infusion of medicine    Vascular Access used (using Ultrasound Guidance and micropuncture needle)  -Right Femoral Vein: 5F  7F  -Left Femoral Vein: 6F 6F 6F  -Right Femoral Artery: none    All sheaths and wires removed and manual pressure applied.    Physician: Romeo  Assistant: None    ANESTHESIA TYPE:  [X]General Anesthesia  [  ] Sedation  [x] Local/Regional    CONDITION  [  ] Critical  [  ] Serious  [  ]Fair  [X]Good    SPECIMENS REMOVED (IF APPLICABLE): NONE  All wires were removed    IMPLANTS (IF APPLICABLE): NONE    FINDINGS (see below)  -No inducible Arrhythmias  -No immediate complications  -ESTIMATED BLOOD LOSS:  15 mL    PLAN OF CARE  -	Bed rest for 4 hours  -	Admit to telemetry due to long procedure under GA

## 2021-07-28 NOTE — CHART NOTE - NSCHARTNOTEFT_GEN_A_CORE
I saw and examined patient and I reviewed his chart and blood work. I attest that there has been no clinical change in patient's condition since last assessment documented in H&P, consult, or last office visit.

## 2021-07-29 ENCOUNTER — TRANSCRIPTION ENCOUNTER (OUTPATIENT)
Age: 51
End: 2021-07-29

## 2021-07-29 VITALS
HEART RATE: 93 BPM | RESPIRATION RATE: 18 BRPM | DIASTOLIC BLOOD PRESSURE: 88 MMHG | SYSTOLIC BLOOD PRESSURE: 161 MMHG | TEMPERATURE: 98 F

## 2021-07-29 PROCEDURE — 93010 ELECTROCARDIOGRAM REPORT: CPT

## 2021-07-29 PROCEDURE — 99238 HOSP IP/OBS DSCHRG MGMT 30/<: CPT

## 2021-07-29 RX ADMIN — Medication 1 MILLIGRAM(S): at 12:29

## 2021-07-29 RX ADMIN — VALSARTAN 320 MILLIGRAM(S): 80 TABLET ORAL at 06:06

## 2021-07-29 RX ADMIN — Medication 25 MILLIGRAM(S): at 06:06

## 2021-07-29 RX ADMIN — AMLODIPINE BESYLATE 5 MILLIGRAM(S): 2.5 TABLET ORAL at 06:06

## 2021-07-29 RX ADMIN — Medication 50 MILLIGRAM(S): at 06:06

## 2021-07-29 NOTE — DISCHARGE NOTE PROVIDER - NSDCFUADDINST_GEN_ALL_CORE_FT
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

## 2021-07-29 NOTE — DISCHARGE NOTE PROVIDER - NSDCCPCAREPLAN_GEN_ALL_CORE_FT
PRINCIPAL DISCHARGE DIAGNOSIS  Diagnosis: Wide-complex tachycardia  Assessment and Plan of Treatment:

## 2021-07-29 NOTE — DISCHARGE NOTE PROVIDER - HOSPITAL COURSE
49 yo M with hx of HTN, HLD, RAYMUNDO on CPAP and NSVT in the past who was admitted for EP study. Patient had no inducible arrhythmias during procedure. Feeling well today

## 2021-07-29 NOTE — DISCHARGE NOTE NURSING/CASE MANAGEMENT/SOCIAL WORK - PATIENT PORTAL LINK FT
You can access the FollowMyHealth Patient Portal offered by [facility] by registering at the following website: [email or web address]. By joining [facility]’s FollowMyHealth portal, you will also be able to view your health information using other applications (apps) compatible with our system.

## 2021-07-29 NOTE — PROGRESS NOTE ADULT - ASSESSMENT
Assessment: 49 yo M with hx of HTN, HLD, RAYMUNDO on CPAP Assessment: 49 yo M with hx of HTN, HLD, RAYMUNDO on CPAP with episodes of WCT in the past admitted for EP study. No inducible arrhythmias during procedure. POD#1 and feeling well    Impression:  WCT sp EP Study  RAYMUNDO on CPAP  HTN   HLD    Plan:  - Continue all home medications  - NO driving until cleared by Dr Walters  - No heavy lifting for 2 weeks  - Follow up with Dr Walters in 3-4 weeks  [address] [place] [address]  [phone] Assessment: 51 yo M with hx of HTN, HLD, RAYMUNDO on CPAP with episodes of WCT in the past admitted for EP study. No inducible arrhythmias during procedure. 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

## 2021-07-29 NOTE — PROGRESS NOTE ADULT - SUBJECTIVE AND OBJECTIVE BOX
INTERVAL HPI/OVERNIGHT EVENTS: No acute events overnight, patient feeling well    MEDICATIONS  (STANDING):  amLODIPine   Tablet 5 milliGRAM(s) Oral daily  folic acid 1 milliGRAM(s) Oral daily  hydrochlorothiazide 25 milliGRAM(s) Oral daily  metoprolol tartrate 50 milliGRAM(s) Oral every 12 hours  valsartan 320 milliGRAM(s) Oral daily    MEDICATIONS  (PRN):  HYDROmorphone  Injectable 0.5 milliGRAM(s) IV Push every 10 minutes PRN Moderate Pain (4 - 6)  HYDROmorphone  Injectable 1 milliGRAM(s) IV Push every 10 minutes PRN Severe Pain (7 - 10)  meperidine     Injectable 12.5 milliGRAM(s) IV Push every 10 minutes PRN Shivering  ondansetron Injectable 4 milliGRAM(s) IV Push once PRN Nausea and/or Vomiting      Allergies  penicillins (Other)    Intolerances        REVIEW OF SYSTEMS: No CP, palpitations, dizziness or SOB    Vital Signs Last 24 Hrs  T(C): 36.8 (29 Jul 2021 04:26), Max: 37.4 (28 Jul 2021 20:04)  T(F): 98.2 (29 Jul 2021 04:26), Max: 99.4 (28 Jul 2021 20:04)  HR: 80 (29 Jul 2021 04:26) (80 - 105)  BP: 136/75 (29 Jul 2021 04:26) (131/72 - 146/79)  BP(mean): --  RR: 18 (29 Jul 2021 04:26) (18 - 19)  SpO2: 98% (29 Jul 2021 08:10) (97% - 98%)    07-28-21 @ 07:01  -  07-29-21 @ 07:00  --------------------------------------------------------  IN: 240 mL / OUT: 1700 mL / NET: -1460 mL    07-29-21 @ 07:01  -  07-29-21 @ 10:30  --------------------------------------------------------  IN: 360 mL / OUT: 450 mL / NET: -90 mL        Physical Exam  GENERAL: In no apparent distress, well nourished, and hydrated.  EYES: EOMI, PERRLA, conjunctiva and sclera clear  ENMT: No tonsillar erythema, exudates, or enlargements; ist mucous membranes, Good dentition, No lesions  NECK: Supple   HEART: Regular rate and rhythm; No murmurs, rubs, or gallops.  PULMONARY: Clear to auscultation and perfusion.  No rales, wheezing, or rhonchi bilaterally.  ABDOMEN: Soft, Nontender, Nondistended; Bowel sounds present  EXTREMITIES:  2+ Peripheral Pulses, No clubbing, cyanosis, or edema  SKIN: Groins healing well BL, no hemaoma  NEUROLOGICAL: Grossly nonfocal    LABS:    RADIOLOGY & ADDITIONAL TESTS:  < from: 12 Lead ECG (07.29.21 @ 07:45) >  Ventricular Rate 83 BPM    Atrial Rate 83 BPM    P-R Interval 156 ms    QRS Duration 76 ms    Q-T Interval 360 ms    QTC Calculation(Bazett) 423 ms    P Axis 65 degrees    R Axis 59 degrees    T Axis 74 degrees    Diagnosis Line Normal sinus rhythm  Normal ECG    Confirmed by Johnny Lopez ([id number]) on 7/29/2021 8:37:52 AM    < end of copied text >

## 2021-07-29 NOTE — DISCHARGE NOTE PROVIDER - CARE PROVIDER_API CALL
Jaime Walters  [address] [place] [address]  Phone: [phone]  Fax: (   )    -  Scheduled Appointment: 08/23/2021

## 2021-07-29 NOTE — DISCHARGE NOTE PROVIDER - NSDCMRMEDTOKEN_GEN_ALL_CORE_FT
amLODIPine 5 mg oral tablet: 1 tab(s) orally once a day  fenofibrate 43 mg oral capsule: 1 cap(s) orally once a day  folic acid 1 mg oral tablet: 1 tab(s) orally once a day  Metoprolol Tartrate 50 mg oral tablet: 1 tab(s) orally 2 times a day  valsartan-hydrochlorothiazide 320mg-25mg oral tablet: 1 tab(s) orally once a day  Welchol 625 mg oral tablet: 3 tab(s) orally 2 times a day

## 2021-07-30 LAB
COVID-19 SPIKE DOMAIN AB INTERP: POSITIVE
COVID-19 SPIKE DOMAIN ANTIBODY RESULT: >250 U/ML — HIGH
SARS-COV-2 IGG+IGM SERPL QL IA: >250 U/ML — HIGH
SARS-COV-2 IGG+IGM SERPL QL IA: POSITIVE

## 2021-08-04 DIAGNOSIS — I10 ESSENTIAL (PRIMARY) HYPERTENSION: ICD-10-CM

## 2021-08-04 DIAGNOSIS — Z99.89 DEPENDENCE ON OTHER ENABLING MACHINES AND DEVICES: ICD-10-CM

## 2021-08-04 DIAGNOSIS — G47.33 OBSTRUCTIVE SLEEP APNEA (ADULT) (PEDIATRIC): ICD-10-CM

## 2021-08-04 DIAGNOSIS — R00.0 TACHYCARDIA, UNSPECIFIED: ICD-10-CM

## 2021-08-04 DIAGNOSIS — E78.5 HYPERLIPIDEMIA, UNSPECIFIED: ICD-10-CM

## 2021-08-23 ENCOUNTER — APPOINTMENT (OUTPATIENT)
Dept: CARDIOLOGY | Facility: CLINIC | Age: 51
End: 2021-08-23
Payer: COMMERCIAL

## 2021-08-23 ENCOUNTER — APPOINTMENT (OUTPATIENT)
Dept: CARDIOLOGY | Facility: CLINIC | Age: 51
End: 2021-08-23

## 2021-08-23 VITALS
HEART RATE: 84 BPM | TEMPERATURE: 98.3 F | WEIGHT: 235 LBS | BODY MASS INDEX: 34.8 KG/M2 | DIASTOLIC BLOOD PRESSURE: 85 MMHG | OXYGEN SATURATION: 95 % | HEIGHT: 69 IN | SYSTOLIC BLOOD PRESSURE: 130 MMHG | RESPIRATION RATE: 16 BRPM

## 2021-08-23 PROCEDURE — 93000 ELECTROCARDIOGRAM COMPLETE: CPT

## 2021-08-23 PROCEDURE — 99214 OFFICE O/P EST MOD 30 MIN: CPT

## 2021-08-23 NOTE — DISCUSSION/SUMMARY
[FreeTextEntry1] : Mr. Brian Gant is a pleasant 50 year-old man, [employer] , with hypertension, hyperlipidemia, RAYMUNDO on CPAP, referred for tachycardia and NSVT on Holter and EST. He has no significant CAD on cath. He has normal EF on echo, and normal LV function and no LGE on CMR. He has mildly depressed RV function and dilated PA. EP study on 7/28/2021 was negative: no inducible SVT, VT; presence of RBBB aberrancy\par \par He has sudden death Father at age of 68 during sleep;\par \par Patient has no prior syncope. \par \par BP at goal today; I recommend to continue same medications; \par \par I recommend to continue Metoprolol for NSVT. Metoprolol dosage recently increased from 25mg to 50mg twice daily after ILR transmission revealed one episode of WCT suspicious for VT on 5/25/2021 during which patient was sleeping and asymptomatic. Subsequently, his amlodipine was lowered from 10mg to 5mg to allow for better blood pressure control. 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 His wound is healed properly, with no signs of inflammation, infection or bleeding. I discussed with patient plan of care in great details. 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 I answered all his questions to his satisfaction. Patient was pleased with the visit.  \par \par Patient will follow with me in 8 months’ time. Please do not hesitate to contact me at [phone] if you have any further questions regarding this patient care.\par

## 2021-08-23 NOTE — PHYSICAL EXAM
[General Appearance - Well Developed] : well developed [Normal Appearance] : normal appearance [Well Groomed] : well groomed [General Appearance - Well Nourished] : well nourished [No Deformities] : no deformities [General Appearance - In No Acute Distress] : no acute distress [Heart Rate And Rhythm] : heart rate and rhythm were normal [Heart Sounds] : normal S1 and S2 [Murmurs] : no murmurs present [Respiration, Rhythm And Depth] : normal respiratory rhythm and effort [Exaggerated Use Of Accessory Muscles For Inspiration] : no accessory muscle use [Auscultation Breath Sounds / Voice Sounds] : lungs were clear to auscultation bilaterally [Clean] : clean [Dry] : dry [Well-Healed] : well-healed [Abdomen Soft] : soft [Nail Clubbing] : no clubbing of the fingernails [Cyanosis, Localized] : no localized cyanosis [Petechial Hemorrhages (___cm)] : no petechial hemorrhages [Normal Conjunctiva] : the conjunctiva exhibited no abnormalities [Eyelids - No Xanthelasma] : the eyelids demonstrated no xanthelasmas [Normal Oral Mucosa] : normal oral mucosa [Abnormal Walk] : normal gait [Gait - Sufficient For Exercise Testing] : the gait was sufficient for exercise testing [Skin Color & Pigmentation] : normal skin color and pigmentation [] : no rash [No Venous Stasis] : no venous stasis [Skin Lesions] : no skin lesions [No Skin Ulcers] : no skin ulcer [No Xanthoma] : no  xanthoma was observed [Oriented To Time, Place, And Person] : oriented to person, place, and time [Affect] : the affect was normal [Mood] : the mood was normal [No Anxiety] : not feeling anxious [FreeTextEntry1] : No JVD

## 2021-08-23 NOTE — PROCEDURE
[No] : not [NSR] : normal sinus rhythm [See Scanned Paceart Report] : See scanned paceart report [See Device Printout] : See device printout [Normal] : The battery status is normal. [Sensing Amplitude ___mv] : sensing amplitude was [unfilled] mv [de-identified] : [employer]onic [de-identified] : LNQ11 [de-identified] : [id number] [de-identified] : "good" [de-identified] : 2/4/21 [de-identified] : Episodes labeled as AT are consistent with sinus Tachycardia\par No arrhythmias; turned off AT detection

## 2021-08-23 NOTE — HISTORY OF PRESENT ILLNESS
[FreeTextEntry1] : Referring Cardiologist: Dr. Sylvain Asher\par Referring Internist: Dr. Compa Bob\par \par Hypertension, hyperlipidemia, RAYMUNDO on CPAP\par MTA ; had tachycardia on Physical\par referred for cardiac work-up: Holter showed 3 beats NSVT\par EST showed NSVT on exertion\par Father sudden death during sleep at age 68\par CMR showed dilated pulmonary artery\par MCOT showed NSVT 14 seconds, 153 bpm (11/12/2020 at 2:11 am)\par EP study on 7/28/2021: no inducible SV%T or VT\par Patient has no prior syncope, no palpitations; He has no chest pain, no shortness of breath, no dyspnea on exertion, no orthopnea, no PND. He denies dizziness, lightheadedness and syncope. He has no exertional symptoms. \par \par \par CARDIAC TESTING:\par ECG (8/23/2021) Sinus rhythm at 83 bpm, no significant ST/T abnormalities\par ECG (6/21/2021) Sinus rhythm at 85 bpm, no significant ST/T abnormalities\par ECG (12/22/2020): sinus rhythm at 99 bpm, no significant ST/T abnormalities\par ECG (10/27/2020): sinus rhythm at 87 bpm, no significant ST/T abnormalities\par Cardiac Cath (7/13/2020): minor irregularities\par Cardiac MRI (12/11/2020): Normal EF, no LGE, Pulmonary hypertension, Consolidation possible pneumonia;\par MCOT (10/27/2020 to 11/25/2020): Duration: 30 days, Average HR 88 bpm, ( bpm), EVENT: \par  -no AF, no SVT, no pause, no AV block\par  -NSVT 14 seconds, 153 bpm (11/12/2020 at 2:11 am)\par EP study (7/28/2021): no inducible SVT or VT; presence of RBBB aberrancy

## 2021-08-23 NOTE — REASON FOR VISIT
[___ Device Check] : is here today for a [unfilled] device check for [Arrhythmias (seen on stored data)] : arrhythmias seen on stored data [FreeTextEntry1] : NSVT

## 2021-09-02 ENCOUNTER — APPOINTMENT (OUTPATIENT)
Dept: CARDIOLOGY | Facility: CLINIC | Age: 51
End: 2021-09-02
Payer: COMMERCIAL

## 2021-09-02 ENCOUNTER — NON-APPOINTMENT (OUTPATIENT)
Age: 51
End: 2021-09-02

## 2021-09-02 ENCOUNTER — TRANSCRIPTION ENCOUNTER (OUTPATIENT)
Age: 51
End: 2021-09-02

## 2021-09-02 PROCEDURE — G2066: CPT

## 2021-09-02 PROCEDURE — 93298 REM INTERROG DEV EVAL SCRMS: CPT

## 2021-10-05 ENCOUNTER — APPOINTMENT (OUTPATIENT)
Dept: CARDIOLOGY | Facility: CLINIC | Age: 51
End: 2021-10-05
Payer: COMMERCIAL

## 2021-10-05 ENCOUNTER — NON-APPOINTMENT (OUTPATIENT)
Age: 51
End: 2021-10-05

## 2021-10-05 PROCEDURE — G2066: CPT

## 2021-10-05 PROCEDURE — 93298 REM INTERROG DEV EVAL SCRMS: CPT

## 2021-11-09 ENCOUNTER — NON-APPOINTMENT (OUTPATIENT)
Age: 51
End: 2021-11-09

## 2021-11-09 ENCOUNTER — APPOINTMENT (OUTPATIENT)
Dept: CARDIOLOGY | Facility: CLINIC | Age: 51
End: 2021-11-09
Payer: COMMERCIAL

## 2021-11-09 PROCEDURE — G2066: CPT | Mod: NC

## 2021-11-09 PROCEDURE — 93298 REM INTERROG DEV EVAL SCRMS: CPT | Mod: NC

## 2021-12-13 ENCOUNTER — TRANSCRIPTION ENCOUNTER (OUTPATIENT)
Age: 51
End: 2021-12-13

## 2021-12-14 ENCOUNTER — APPOINTMENT (OUTPATIENT)
Dept: CARDIOLOGY | Facility: CLINIC | Age: 51
End: 2021-12-14
Payer: COMMERCIAL

## 2021-12-14 ENCOUNTER — NON-APPOINTMENT (OUTPATIENT)
Age: 51
End: 2021-12-14

## 2021-12-14 PROCEDURE — G2066: CPT

## 2021-12-14 PROCEDURE — 93298 REM INTERROG DEV EVAL SCRMS: CPT

## 2021-12-20 ENCOUNTER — NON-APPOINTMENT (OUTPATIENT)
Age: 51
End: 2021-12-20

## 2022-01-14 LAB
ALBUMIN SERPL ELPH-MCNC: 4.8 G/DL
ALP BLD-CCNC: 57 U/L
ALT SERPL-CCNC: 48 U/L
AST SERPL-CCNC: 54 U/L
BASOPHILS # BLD AUTO: 0.03 K/UL
BASOPHILS NFR BLD AUTO: 0.3 %
BILIRUB DIRECT SERPL-MCNC: <0.2 MG/DL
BILIRUB INDIRECT SERPL-MCNC: >0.1 MG/DL
BILIRUB SERPL-MCNC: 0.3 MG/DL
EOSINOPHIL # BLD AUTO: 0.29 K/UL
EOSINOPHIL NFR BLD AUTO: 3.2 %
HCT VFR BLD CALC: 39.1 %
HGB BLD-MCNC: 12.3 G/DL
IMM GRANULOCYTES NFR BLD AUTO: 0.7 %
LYMPHOCYTES # BLD AUTO: 1.91 K/UL
LYMPHOCYTES NFR BLD AUTO: 20.9 %
MAN DIFF?: NORMAL
MCHC RBC-ENTMCNC: 29.9 PG
MCHC RBC-ENTMCNC: 31.5 G/DL
MCV RBC AUTO: 94.9 FL
MONOCYTES # BLD AUTO: 0.95 K/UL
MONOCYTES NFR BLD AUTO: 10.4 %
NEUTROPHILS # BLD AUTO: 5.88 K/UL
NEUTROPHILS NFR BLD AUTO: 64.5 %
PLATELET # BLD AUTO: 179 K/UL
PROT SERPL-MCNC: 7.6 G/DL
PSA SERPL-MCNC: 0.45 NG/ML
RBC # BLD: 4.12 M/UL
RBC # FLD: 13.9 %
TESTOST FREE SERPL-MCNC: 15.9 PG/ML
TESTOST SERPL-MCNC: 392 NG/DL
WBC # FLD AUTO: 9.12 K/UL

## 2022-01-20 ENCOUNTER — APPOINTMENT (OUTPATIENT)
Dept: CARDIOLOGY | Facility: CLINIC | Age: 52
End: 2022-01-20
Payer: COMMERCIAL

## 2022-01-20 ENCOUNTER — NON-APPOINTMENT (OUTPATIENT)
Age: 52
End: 2022-01-20

## 2022-01-20 PROCEDURE — G2066: CPT

## 2022-01-20 PROCEDURE — 93298 REM INTERROG DEV EVAL SCRMS: CPT

## 2022-01-24 ENCOUNTER — APPOINTMENT (OUTPATIENT)
Dept: UROLOGY | Facility: CLINIC | Age: 52
End: 2022-01-24
Payer: COMMERCIAL

## 2022-01-24 VITALS — BODY MASS INDEX: 34.8 KG/M2 | HEIGHT: 69 IN | TEMPERATURE: 98.2 F | WEIGHT: 235 LBS

## 2022-01-24 DIAGNOSIS — N52.9 MALE ERECTILE DYSFUNCTION, UNSPECIFIED: ICD-10-CM

## 2022-01-24 PROCEDURE — 99214 OFFICE O/P EST MOD 30 MIN: CPT

## 2022-01-24 NOTE — HISTORY OF PRESENT ILLNESS
[FreeTextEntry1] : Aquilino Gant is a 50 year old male with a past medical history of testicular hypofunction and impotence. Patient was decreased to Testosterone cypionate injections 0.25 mL every 5 days from 0.5 mL every 5 days. Patient presents to office today to review lab work and assess symptoms. \par Patient reports he continues to do well. Good energy. Good libido. \par Denies urinary complaints this visit. \par ED managed on Sildenafil 100 mg. \par \par Jan 2022 Blood work:\par Testosterone total- 392.0 ng/dL\par PSA- 0.45 ng/mL\par AST- 54\par ALT- 48\par Indirect Bilirubin >0.1\par Hemoglobin- 12.3 g/dL\par \par Previous Records:\par June 2021 blood work:\par Testosterone Total- 411 ng/dL\par SHBG- 24\par AST- 48\par ALT- 41\par Hemoglobin- 12.6 L\par \par April 2021\par Total T 896\par Liver enzymes -- ast 66, alt 54\par CBC -- 13.3\par \par 2/19/2020\par -PSA 0.6 ng/ml, 33 % free PSA\par -Total testosterone 762 (250-1100), Bioavailable 289.3 (110-575), testosterone free 140.7 ()\par \par 1/4/2019.- Trough\par -hemoglobin 13.4, platelets 207\par -Normal LFT\par -Total T 416 (250-1100), Bioavailable 152.9 (110-575), testosterone free 71.3 ()\par \par 1/5/2019.- Peak\par -PSA 0.6 ng/ml, 33 % Free PSA\par -Total T 599 (250-1100), Bioavailable 247.6 (110-575), testosterone free 117.9 ()

## 2022-01-24 NOTE — ASSESSMENT
[FreeTextEntry1] : 51 year old with testicular hypofunction. \par Continues to do well managed on Testosterone cypionate 0.25 mL every 5 days. \par Blood work was reviewed with patient. \par Patient to repeat Hepatic Function Panel in 6 weeks. \par Patient to follow up with Primary Medical Doctor for low hemoglobin. \par \par

## 2022-02-14 ENCOUNTER — TRANSCRIPTION ENCOUNTER (OUTPATIENT)
Age: 52
End: 2022-02-14

## 2022-02-24 ENCOUNTER — NON-APPOINTMENT (OUTPATIENT)
Age: 52
End: 2022-02-24

## 2022-02-24 ENCOUNTER — APPOINTMENT (OUTPATIENT)
Dept: CARDIOLOGY | Facility: CLINIC | Age: 52
End: 2022-02-24
Payer: COMMERCIAL

## 2022-02-24 PROCEDURE — G2066: CPT

## 2022-02-24 PROCEDURE — 93298 REM INTERROG DEV EVAL SCRMS: CPT

## 2022-03-14 LAB
ALBUMIN SERPL ELPH-MCNC: 4.8 G/DL
ALP BLD-CCNC: 57 U/L
ALT SERPL-CCNC: 28 U/L
AST SERPL-CCNC: 36 U/L
BILIRUB DIRECT SERPL-MCNC: <0.2 MG/DL
BILIRUB INDIRECT SERPL-MCNC: >0.2 MG/DL
BILIRUB SERPL-MCNC: 0.4 MG/DL
PROT SERPL-MCNC: 7.5 G/DL

## 2022-03-31 ENCOUNTER — APPOINTMENT (OUTPATIENT)
Dept: CARDIOLOGY | Facility: CLINIC | Age: 52
End: 2022-03-31
Payer: COMMERCIAL

## 2022-03-31 ENCOUNTER — NON-APPOINTMENT (OUTPATIENT)
Age: 52
End: 2022-03-31

## 2022-03-31 PROCEDURE — 93298 REM INTERROG DEV EVAL SCRMS: CPT

## 2022-03-31 PROCEDURE — G2066: CPT

## 2022-04-04 ENCOUNTER — APPOINTMENT (OUTPATIENT)
Dept: UROLOGY | Facility: CLINIC | Age: 52
End: 2022-04-04
Payer: COMMERCIAL

## 2022-04-04 DIAGNOSIS — R74.8 ABNORMAL LEVELS OF OTHER SERUM ENZYMES: ICD-10-CM

## 2022-04-04 PROCEDURE — 99214 OFFICE O/P EST MOD 30 MIN: CPT | Mod: 95

## 2022-04-04 NOTE — ASSESSMENT
[FreeTextEntry1] : \par Aquilino Gant is a 50 year old male with a past medical history of testicular hypofunction and impotence. Patient was decreased to Testosterone cypionate injections 0.25 mL every 5 days from 0.5 mL every 5 days. Patient presents to office today to review lab work and assess symptoms. \par Patient reports he continues to do well. Good energy. Good libido. \par Denies urinary complaints this visit. \par ED managed on Sildenafil 100 mg. \par \par last visit his liver enzymes were a little high -- these were repeated a few months later and back down to normal\par \par \par \par Jan 2022 Blood work:\par Testosterone total- 392.0 ng/dL\par PSA- 0.45 ng/mL\par AST- 54\par ALT- 48\par Indirect Bilirubin >0.1\par Hemoglobin- 12.3 g/dL\par \par Previous Records:\par June 2021 blood work:\par Testosterone Total- 411 ng/dL\par SHBG- 24\par AST- 48\par ALT- 41\par Hemoglobin- 12.6 L\par \par April 2021\par Total T 896\par Liver enzymes -- ast 66, alt 54\par CBC -- 13.3\par \par 2/19/2020\par -PSA 0.6 ng/ml, 33 % free PSA\par -Total testosterone 762 (250-1100), Bioavailable 289.3 (110-575), testosterone free 140.7 ()\par \par 1/4/2019.- Trough\par -hemoglobin 13.4, platelets 207\par -Normal LFT\par -Total T 416 (250-1100), Bioavailable 152.9 (110-575), testosterone free 71.3 ()\par \par 1/5/2019.- Peak\par -PSA 0.6 ng/ml, 33 % Free PSA\par -Total T 599 (250-1100), Bioavailable 247.6 (110-575), testosterone free 117.9 () \par \par \par

## 2022-04-04 NOTE — HISTORY OF PRESENT ILLNESS
[Home] : at home, [unfilled] , at the time of the visit. [Medical Office: ([facility])___] : at the medical office located in  [Verbal consent obtained from patient] : the patient, [unfilled] [FreeTextEntry1] : TELEMEDICINE -- HPI FOR FOLLOW UP APPOINTMENT\par \par The patient-doctor relationship has been established in a face to face fashion via real time video/audio HIPAA compliant communication using telemedicine software. The patient's identity has been confirmed. The patient was previously emailed a copy of the telemedicine consent. They have had a chance to review and has now given verbal consent and has requested care to be assessed and treated through telemedicine and understands there maybe limitations in this process and they may need further follow up care in the office and or hospital settings.\par \par The patient denies fevers, chills, nausea and or vomiting and no unexplained weight loss.\par \par All pertinent parts of the patient PFSH (past medical, family and social histories), laboratory, radiological studies and physician notes were reviewed prior to starting the face to face portion of the telemedicine visit. Questionnaire results were discussed with patient.\par \par ==================================================================================================== \par \par Aquilino Gant is a 50 year old male with a past medical history of testicular hypofunction and impotence. Patient was decreased to Testosterone cypionate injections 0.25 mL every 5 days from 0.5 mL every 5 days. Patient presents to office today to review lab work and assess symptoms. \par Patient reports he continues to do well. Good energy. Good libido. \par Denies urinary complaints this visit. \par ED managed on Sildenafil 100 mg. \par \par last visit his liver enzymes were a little high -- these were repeated a few months later and back down to normal\par \par \par \par Jan 2022 Blood work:\par Testosterone total- 392.0 ng/dL\par PSA- 0.45 ng/mL\par AST- 54\par ALT- 48\par Indirect Bilirubin >0.1\par Hemoglobin- 12.3 g/dL\par \par Previous Records:\par June 2021 blood work:\par Testosterone Total- 411 ng/dL\par SHBG- 24\par AST- 48\par ALT- 41\par Hemoglobin- 12.6 L\par \par April 2021\par Total T 896\par Liver enzymes -- ast 66, alt 54\par CBC -- 13.3\par \par 2/19/2020\par -PSA 0.6 ng/ml, 33 % free PSA\par -Total testosterone 762 (250-1100), Bioavailable 289.3 (110-575), testosterone free 140.7 ()\par \par 1/4/2019.- Trough\par -hemoglobin 13.4, platelets 207\par -Normal LFT\par -Total T 416 (250-1100), Bioavailable 152.9 (110-575), testosterone free 71.3 ()\par \par 1/5/2019.- Peak\par -PSA 0.6 ng/ml, 33 % Free PSA\par -Total T 599 (250-1100), Bioavailable 247.6 (110-575), testosterone free 117.9 () \par \par \par \par [phone]\par \par [email or web address]\par \par labs in chart

## 2022-04-25 ENCOUNTER — APPOINTMENT (OUTPATIENT)
Dept: CARDIOLOGY | Facility: CLINIC | Age: 52
End: 2022-04-25
Payer: COMMERCIAL

## 2022-04-25 VITALS
BODY MASS INDEX: 34.8 KG/M2 | HEART RATE: 83 BPM | WEIGHT: 235 LBS | TEMPERATURE: 98 F | DIASTOLIC BLOOD PRESSURE: 77 MMHG | RESPIRATION RATE: 16 BRPM | HEIGHT: 69 IN | SYSTOLIC BLOOD PRESSURE: 132 MMHG

## 2022-04-25 PROCEDURE — 99214 OFFICE O/P EST MOD 30 MIN: CPT

## 2022-04-25 PROCEDURE — 93000 ELECTROCARDIOGRAM COMPLETE: CPT

## 2022-04-25 NOTE — PROCEDURE
[No] : not [NSR] : normal sinus rhythm [Normal] : The battery status is normal. [Sensing Amplitude ___mv] : sensing amplitude was [unfilled] mv [None] : none [Programmed for Longevity] : output reprogrammed for improved battery longevity [de-identified] : 82 BPM [de-identified] : JULIUS [de-identified] : ELIOTQ LNQ11 [de-identified] : TESS [de-identified] : 67 AT episodes recorded showing sinus tachycardia rates 100-143 BPM, no symptoms from patient,suggest raising tachy detection rate higher.\par Transmitting on [facility] - \par \par I turned off detection for AT

## 2022-04-25 NOTE — PHYSICAL EXAM
[Normal Appearance] : normal appearance [General Appearance - Well Developed] : well developed [Well Groomed] : well groomed [General Appearance - Well Nourished] : well nourished [No Deformities] : no deformities [General Appearance - In No Acute Distress] : no acute distress [Heart Rate And Rhythm] : heart rate and rhythm were normal [Heart Sounds] : normal S1 and S2 [Murmurs] : no murmurs present [Respiration, Rhythm And Depth] : normal respiratory rhythm and effort [Exaggerated Use Of Accessory Muscles For Inspiration] : no accessory muscle use [Auscultation Breath Sounds / Voice Sounds] : lungs were clear to auscultation bilaterally [Clean] : clean [Dry] : dry [Well-Healed] : well-healed [Abdomen Soft] : soft [Nail Clubbing] : no clubbing of the fingernails [Cyanosis, Localized] : no localized cyanosis [Petechial Hemorrhages (___cm)] : no petechial hemorrhages [Normal Conjunctiva] : the conjunctiva exhibited no abnormalities [Eyelids - No Xanthelasma] : the eyelids demonstrated no xanthelasmas [Normal Oral Mucosa] : normal oral mucosa [FreeTextEntry1] : No JVD [Abnormal Walk] : normal gait [Gait - Sufficient For Exercise Testing] : the gait was sufficient for exercise testing [Skin Color & Pigmentation] : normal skin color and pigmentation [] : no rash [No Venous Stasis] : no venous stasis [Skin Lesions] : no skin lesions [No Skin Ulcers] : no skin ulcer [No Xanthoma] : no  xanthoma was observed [Oriented To Time, Place, And Person] : oriented to person, place, and time [Affect] : the affect was normal [Mood] : the mood was normal [No Anxiety] : not feeling anxious

## 2022-04-25 NOTE — CARDIOLOGY SUMMARY
[de-identified] : ECG (4/25/2022) Sinus rhythm at 85 bpm, no significant ST/T abnormalities\par ECG (8/23/2021) Sinus rhythm at 83 bpm, no significant ST/T abnormalities\par ECG (6/21/2021) Sinus rhythm at 85 bpm, no significant ST/T abnormalities\par ECG (12/22/2020): sinus rhythm at 99 bpm, no significant ST/T abnormalities\par ECG (10/27/2020): sinus rhythm at 87 bpm, no significant ST/T abnormalities [de-identified] : MCOT (10/27/2020 to 11/25/2020): Duration: 30 days, Average HR 88 bpm, ( bpm), EVENT: \par  -no AF, no SVT, no pause, no AV block\par  -NSVT 14 seconds, 153 bpm (11/12/2020 at 2:11 am) [de-identified] : Cardiac MRI (12/11/2020): Normal EF, no LGE, Pulmonary hypertension, Consolidation possible pneumonia; [de-identified] : EP study (7/28/2021): no inducible SVT or VT; presence of RBBB aberrancy [de-identified] : Cardiac Cath (7/13/2020): minor irregularities\par

## 2022-04-25 NOTE — PROCEDURE
[No] : not [NSR] : normal sinus rhythm [Normal] : The battery status is normal. [Sensing Amplitude ___mv] : sensing amplitude was [unfilled] mv [None] : none [Programmed for Longevity] : output reprogrammed for improved battery longevity [de-identified] : 82 BPM [de-identified] : JULIUS [de-identified] : ELIOTQ LNQ11 [de-identified] : TESS [de-identified] : 67 AT episodes recorded showing sinus tachycardia rates 100-143 BPM, no symptoms from patient,suggest raising tachy detection rate higher.\par Transmitting on [facility] - \par \par I turned off detection for AT

## 2022-04-25 NOTE — HISTORY OF PRESENT ILLNESS
[FreeTextEntry1] : Referring Internist: Dr. Compa Bob\par Referring Cardiologist: Dr. Sylvain Asher\par \par \par Hypertension, hyperlipidemia, RAYMUNDO on CPAP\par MTA ; had tachycardia on Physical\par referred for cardiac work-up: Holter showed 3 beats NSVT\par EST showed NSVT on exertion\par Father sudden death during sleep at age 68\par CMR showed dilated pulmonary artery\par MCOT showed NSVT 14 seconds, 153 bpm (11/12/2020 at 2:11 am)\par EP study on 7/28/2021: no inducible SVT or VT\par 4/25/2022: 67 AT episodes recorded showing sinus tachycardia rates 100-143 BPM, no symptoms\par Patient has no prior syncope, no palpitations; He has no chest pain, no shortness of breath, no dyspnea on exertion, no orthopnea, no PND. He denies dizziness, lightheadedness and syncope. He has no exertional symptoms.

## 2022-04-25 NOTE — DISCUSSION/SUMMARY
[FreeTextEntry1] : Mr. Brian Gant is a pleasant 51 year-old man, [employer] , with hypertension, hyperlipidemia, RAYMUNDO on CPAP, referred for tachycardia and NSVT on Holter and EST. He has no significant CAD on cath. He has normal EF on echo, and normal LV function and no LGE on CMR. He has mildly depressed RV function and dilated PA. EP study on 7/28/2021 was negative: no inducible SVT, VT; presence of RBBB aberrancy\par \par He has sudden death Father at age of 68 during sleep;\par \par Patient has no prior syncope. \par \par BP at goal today; I recommend to continue same medications; \par \par I recommend to continue Metoprolol for NSVT. Metoprolol dosage 50mg twice daily after ILR transmission revealed one episode of WCT suspicious for VT on 5/25/2021 during which patient was sleeping and asymptomatic. 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 Patient can be on restricted duty.\par \par I interrogated and reprogrammed his device as described in procedure. His wound is healed properly, with no signs of inflammation, infection or bleeding. I discussed with patient plan of care in great details. 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 I answered all his questions to his satisfaction. Patient was pleased with the visit.  \par \par Patient will follow with me in 9 months’ time. Please do not hesitate to contact me at [phone] if you have any further questions regarding this patient care.\par

## 2022-04-25 NOTE — CARDIOLOGY SUMMARY
[de-identified] : ECG (4/25/2022) Sinus rhythm at 85 bpm, no significant ST/T abnormalities\par ECG (8/23/2021) Sinus rhythm at 83 bpm, no significant ST/T abnormalities\par ECG (6/21/2021) Sinus rhythm at 85 bpm, no significant ST/T abnormalities\par ECG (12/22/2020): sinus rhythm at 99 bpm, no significant ST/T abnormalities\par ECG (10/27/2020): sinus rhythm at 87 bpm, no significant ST/T abnormalities [de-identified] : MCOT (10/27/2020 to 11/25/2020): Duration: 30 days, Average HR 88 bpm, ( bpm), EVENT: \par  -no AF, no SVT, no pause, no AV block\par  -NSVT 14 seconds, 153 bpm (11/12/2020 at 2:11 am) [de-identified] : Cardiac MRI (12/11/2020): Normal EF, no LGE, Pulmonary hypertension, Consolidation possible pneumonia; [de-identified] : EP study (7/28/2021): no inducible SVT or VT; presence of RBBB aberrancy [de-identified] : Cardiac Cath (7/13/2020): minor irregularities\par

## 2022-04-25 NOTE — DISCUSSION/SUMMARY
[FreeTextEntry1] : Mr. Brian Gant is a pleasant 51 year-old man, [employer] , with hypertension, hyperlipidemia, RAYMUNDO on CPAP, referred for tachycardia and NSVT on Holter and EST. He has no significant CAD on cath. He has normal EF on echo, and normal LV function and no LGE on CMR. He has mildly depressed RV function and dilated PA. EP study on 7/28/2021 was negative: no inducible SVT, VT; presence of RBBB aberrancy\par \par He has sudden death Father at age of 68 during sleep;\par \par Patient has no prior syncope. \par \par BP at goal today; I recommend to continue same medications; \par \par I recommend to continue Metoprolol for NSVT. Metoprolol dosage 50mg twice daily after ILR transmission revealed one episode of WCT suspicious for VT on 5/25/2021 during which patient was sleeping and asymptomatic. 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 Patient can be on restricted duty.\par \par I interrogated and reprogrammed his device as described in procedure. His wound is healed properly, with no signs of inflammation, infection or bleeding. I discussed with patient plan of care in great details. 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 I answered all his questions to his satisfaction. Patient was pleased with the visit.  \par \par Patient will follow with me in 9 months’ time. Please do not hesitate to contact me at [phone] if you have any further questions regarding this patient care.\par

## 2022-05-10 ENCOUNTER — RX RENEWAL (OUTPATIENT)
Age: 52
End: 2022-05-10

## 2022-05-26 ENCOUNTER — APPOINTMENT (OUTPATIENT)
Dept: CARDIOLOGY | Facility: CLINIC | Age: 52
End: 2022-05-26
Payer: COMMERCIAL

## 2022-05-26 ENCOUNTER — NON-APPOINTMENT (OUTPATIENT)
Age: 52
End: 2022-05-26

## 2022-05-26 PROCEDURE — G2066: CPT

## 2022-05-26 PROCEDURE — 93298 REM INTERROG DEV EVAL SCRMS: CPT

## 2022-06-30 ENCOUNTER — APPOINTMENT (OUTPATIENT)
Dept: CARDIOLOGY | Facility: CLINIC | Age: 52
End: 2022-06-30

## 2022-06-30 ENCOUNTER — NON-APPOINTMENT (OUTPATIENT)
Age: 52
End: 2022-06-30

## 2022-06-30 PROCEDURE — 93298 REM INTERROG DEV EVAL SCRMS: CPT

## 2022-06-30 PROCEDURE — G2066: CPT

## 2022-07-12 ENCOUNTER — TRANSCRIPTION ENCOUNTER (OUTPATIENT)
Age: 52
End: 2022-07-12

## 2022-08-04 ENCOUNTER — APPOINTMENT (OUTPATIENT)
Dept: CARDIOLOGY | Facility: CLINIC | Age: 52
End: 2022-08-04

## 2022-08-04 ENCOUNTER — NON-APPOINTMENT (OUTPATIENT)
Age: 52
End: 2022-08-04

## 2022-08-04 PROCEDURE — 93298 REM INTERROG DEV EVAL SCRMS: CPT

## 2022-08-04 PROCEDURE — G2066: CPT

## 2022-09-09 ENCOUNTER — APPOINTMENT (OUTPATIENT)
Dept: CARDIOLOGY | Facility: CLINIC | Age: 52
End: 2022-09-09

## 2022-09-09 ENCOUNTER — NON-APPOINTMENT (OUTPATIENT)
Age: 52
End: 2022-09-09

## 2022-09-09 PROCEDURE — G2066: CPT

## 2022-09-09 PROCEDURE — 93298 REM INTERROG DEV EVAL SCRMS: CPT

## 2022-09-28 ENCOUNTER — TRANSCRIPTION ENCOUNTER (OUTPATIENT)
Age: 52
End: 2022-09-28

## 2022-10-14 ENCOUNTER — NON-APPOINTMENT (OUTPATIENT)
Age: 52
End: 2022-10-14

## 2022-10-14 ENCOUNTER — APPOINTMENT (OUTPATIENT)
Dept: CARDIOLOGY | Facility: CLINIC | Age: 52
End: 2022-10-14

## 2022-10-14 PROCEDURE — 93298 REM INTERROG DEV EVAL SCRMS: CPT

## 2022-10-14 PROCEDURE — G2066: CPT

## 2022-11-04 ENCOUNTER — TRANSCRIPTION ENCOUNTER (OUTPATIENT)
Age: 52
End: 2022-11-04

## 2022-11-18 ENCOUNTER — NON-APPOINTMENT (OUTPATIENT)
Age: 52
End: 2022-11-18

## 2022-11-18 ENCOUNTER — APPOINTMENT (OUTPATIENT)
Dept: CARDIOLOGY | Facility: CLINIC | Age: 52
End: 2022-11-18

## 2022-11-18 PROCEDURE — 93298 REM INTERROG DEV EVAL SCRMS: CPT

## 2022-11-18 PROCEDURE — G2066: CPT

## 2022-12-19 ENCOUNTER — APPOINTMENT (OUTPATIENT)
Dept: CARDIOLOGY | Facility: CLINIC | Age: 52
End: 2022-12-19

## 2022-12-19 ENCOUNTER — APPOINTMENT (OUTPATIENT)
Dept: CARDIOLOGY | Facility: CLINIC | Age: 52
End: 2022-12-19
Payer: COMMERCIAL

## 2022-12-19 VITALS
SYSTOLIC BLOOD PRESSURE: 119 MMHG | BODY MASS INDEX: 34.8 KG/M2 | DIASTOLIC BLOOD PRESSURE: 71 MMHG | HEART RATE: 80 BPM | TEMPERATURE: 98.1 F | HEIGHT: 69 IN | WEIGHT: 235 LBS

## 2022-12-19 DIAGNOSIS — E78.5 HYPERLIPIDEMIA, UNSPECIFIED: ICD-10-CM

## 2022-12-19 PROCEDURE — 99214 OFFICE O/P EST MOD 30 MIN: CPT | Mod: 25

## 2022-12-19 PROCEDURE — 93000 ELECTROCARDIOGRAM COMPLETE: CPT

## 2022-12-20 ENCOUNTER — LABORATORY RESULT (OUTPATIENT)
Age: 52
End: 2022-12-20

## 2023-01-04 ENCOUNTER — APPOINTMENT (OUTPATIENT)
Dept: UROLOGY | Facility: CLINIC | Age: 53
End: 2023-01-04
Payer: COMMERCIAL

## 2023-01-04 VITALS
DIASTOLIC BLOOD PRESSURE: 79 MMHG | SYSTOLIC BLOOD PRESSURE: 141 MMHG | HEIGHT: 69 IN | WEIGHT: 235 LBS | HEART RATE: 98 BPM | BODY MASS INDEX: 34.8 KG/M2

## 2023-01-04 PROCEDURE — 99213 OFFICE O/P EST LOW 20 MIN: CPT

## 2023-01-04 NOTE — ASSESSMENT
[FreeTextEntry1] : Aquilino Gant is a 52 year old male with a past medical history of testicular hypofunction and impotence. Patient was decreased to Testosterone cypionate injections 0.25 mL every 5 days from 0.5 mL every 5 days. Patient presents to office today to review lab work and assess symptoms. \par Patient reports he continues to do well. Good energy. Good libido. \par Denies urinary complaints this visit. \par ED managed on Sildenafil 100 mg. \par \par last visit his liver enzymes were a little high -- these were repeated a few months later and back down to normal\par \par Jan 2022 Blood work:\par Testosterone total- 392.0 ng/dL\par PSA- 0.45 ng/mL\par AST- 54\par ALT- 48\par Indirect Bilirubin >0.1\par Hemoglobin- 12.3 g/dL\par \par Previous Records:\par June 2021 blood work:\par Testosterone Total- 411 ng/dL\par SHBG- 24\par AST- 48\par ALT- 41\par Hemoglobin- 12.6 L\par \par April 2021\par Total T 896\par Liver enzymes -- ast 66, alt 54\par CBC -- 13.3\par \par 2/19/2020\par -PSA 0.6 ng/ml, 33 % free PSA\par -Total testosterone 762 (250-1100), Bioavailable 289.3 (110-575), testosterone free 140.7 ()\par \par 1/4/2019.- Trough\par -hemoglobin 13.4, platelets 207\par -Normal LFT\par -Total T 416 (250-1100), Bioavailable 152.9 (110-575), testosterone free 71.3 ()\par \par 1/5/2019.- Peak\par -PSA 0.6 ng/ml, 33 % Free PSA\par -Total T 599 (250-1100), Bioavailable 247.6 (110-575), testosterone free 117.9 () \par \par  Dec 20 2023\par Complete Blood Count - hgb 12.6\par Hepatic Function Panel;  AST - 51, ALT - 44\par PSA Profile - Total - 0.47\par Testosterone, Total - 330\par

## 2023-01-23 ENCOUNTER — NON-APPOINTMENT (OUTPATIENT)
Age: 53
End: 2023-01-23

## 2023-01-23 ENCOUNTER — APPOINTMENT (OUTPATIENT)
Dept: CARDIOLOGY | Facility: CLINIC | Age: 53
End: 2023-01-23
Payer: COMMERCIAL

## 2023-01-23 PROCEDURE — 93298 REM INTERROG DEV EVAL SCRMS: CPT

## 2023-01-23 PROCEDURE — G2066: CPT

## 2023-01-27 ENCOUNTER — TRANSCRIPTION ENCOUNTER (OUTPATIENT)
Age: 53
End: 2023-01-27

## 2023-02-24 ENCOUNTER — APPOINTMENT (OUTPATIENT)
Dept: CARDIOLOGY | Facility: CLINIC | Age: 53
End: 2023-02-24
Payer: COMMERCIAL

## 2023-02-24 ENCOUNTER — NON-APPOINTMENT (OUTPATIENT)
Age: 53
End: 2023-02-24

## 2023-02-24 PROCEDURE — 93298 REM INTERROG DEV EVAL SCRMS: CPT

## 2023-02-24 PROCEDURE — G2066: CPT

## 2023-03-30 ENCOUNTER — TRANSCRIPTION ENCOUNTER (OUTPATIENT)
Age: 53
End: 2023-03-30

## 2023-03-30 ENCOUNTER — APPOINTMENT (OUTPATIENT)
Dept: CARDIOLOGY | Facility: CLINIC | Age: 53
End: 2023-03-30
Payer: COMMERCIAL

## 2023-03-30 ENCOUNTER — NON-APPOINTMENT (OUTPATIENT)
Age: 53
End: 2023-03-30

## 2023-03-30 PROCEDURE — G2066: CPT

## 2023-03-30 PROCEDURE — 93298 REM INTERROG DEV EVAL SCRMS: CPT

## 2023-04-01 PROBLEM — E78.5 HYPERLIPIDEMIA, UNSPECIFIED HYPERLIPIDEMIA TYPE: Status: ACTIVE | Noted: 2020-10-27

## 2023-04-01 NOTE — PROCEDURE
[de-identified] : 82 BPM [de-identified] : JULIUS [de-identified] : ELIOTQ LNQ11 [de-identified] : TESS [de-identified] : Good [de-identified] : 2 ST episodes detected lasting up to 14 sec. (avg. v-rate: 158 bpm).

## 2023-04-01 NOTE — DISCUSSION/SUMMARY
[FreeTextEntry1] : Mr. Brian Gant is a pleasant 52 year-old man, [employer] , with hypertension, hyperlipidemia, RAYMUNDO on CPAP, referred for tachycardia and NSVT on Holter and EST. He has no significant CAD on cath. He has normal EF on echo, and normal LV function and no LGE on CMR. He has mildly depressed RV function and dilated PA. EP study on 7/28/2021 was negative: no inducible SVT, VT; presence of RBBB aberrancy\par \par He has sudden death Father at age of 68 during sleep;\par \par Patient has no prior syncope. \par \par BP at goal; discussed low salt diet; same medications; \par \par /79 today; I recommend to continue same medications; \par \par I recommend to continue Metoprolol for NSVT. Metoprolol dosage 50mg twice daily after ILR transmission revealed one episode of WCT suspicious for VT on 5/25/2021 during which patient was sleeping and asymptomatic. 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 Patient can be on restricted duty.\par \par I interrogated and reprogrammed his device as described in procedure. His wound is healed properly, with no signs of inflammation, infection or bleeding. I discussed with patient plan of care in great details. 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 I answered all his questions to his satisfaction. Patient was pleased with the visit.  \par \par Patient will follow with me in 9 months’ time. Please do not hesitate to contact me at [phone] if you have any further questions regarding this patient care.\par

## 2023-04-01 NOTE — HISTORY OF PRESENT ILLNESS
[FreeTextEntry1] : Referring Internist: Dr. Compa Bob\par Referring Cardiologist: Dr. Sylvain Asher\par \par \par Hypertension, hyperlipidemia, RAYMUNDO on CPAP\par MTA ; had tachycardia on Physical\par referred for cardiac work-up: Holter showed 3 beats NSVT\par EST showed NSVT on exertion\par Father sudden death during sleep at age 68\par CMR showed dilated pulmonary artery\par MCOT showed NSVT 14 seconds, 153 bpm (11/12/2020 at 2:11 am)\par EP study on 7/28/2021: no inducible SVT or VT\par 4/25/2022: 67 AT episodes recorded showing sinus tachycardia rates 100-143 BPM, no symptoms\par 12/19/2022: 2 brief sinus Tc; no symptoms\par Patient has no prior syncope, no palpitations; He has no chest pain, no shortness of breath, no dyspnea on exertion, no orthopnea, no PND. He denies dizziness, lightheadedness and syncope. He has no exertional symptoms.

## 2023-04-05 ENCOUNTER — RX RENEWAL (OUTPATIENT)
Age: 53
End: 2023-04-05

## 2023-05-04 ENCOUNTER — APPOINTMENT (OUTPATIENT)
Dept: CARDIOLOGY | Facility: CLINIC | Age: 53
End: 2023-05-04
Payer: COMMERCIAL

## 2023-05-04 ENCOUNTER — NON-APPOINTMENT (OUTPATIENT)
Age: 53
End: 2023-05-04

## 2023-05-04 PROCEDURE — G2066: CPT

## 2023-05-04 PROCEDURE — 93298 REM INTERROG DEV EVAL SCRMS: CPT

## 2023-05-10 ENCOUNTER — TRANSCRIPTION ENCOUNTER (OUTPATIENT)
Age: 53
End: 2023-05-10

## 2023-05-24 ENCOUNTER — TRANSCRIPTION ENCOUNTER (OUTPATIENT)
Age: 53
End: 2023-05-24

## 2023-06-08 ENCOUNTER — APPOINTMENT (OUTPATIENT)
Dept: CARDIOLOGY | Facility: CLINIC | Age: 53
End: 2023-06-08
Payer: COMMERCIAL

## 2023-06-08 ENCOUNTER — NON-APPOINTMENT (OUTPATIENT)
Age: 53
End: 2023-06-08

## 2023-06-08 PROCEDURE — 93298 REM INTERROG DEV EVAL SCRMS: CPT

## 2023-06-08 PROCEDURE — G2066: CPT

## 2023-06-23 ENCOUNTER — LABORATORY RESULT (OUTPATIENT)
Age: 53
End: 2023-06-23

## 2023-06-24 LAB
ALBUMIN SERPL ELPH-MCNC: 4.6 G/DL
ALP BLD-CCNC: 52 U/L
ALT SERPL-CCNC: 30 U/L
AST SERPL-CCNC: 41 U/L
BILIRUB DIRECT SERPL-MCNC: <0.2 MG/DL
BILIRUB INDIRECT SERPL-MCNC: NORMAL MG/DL
BILIRUB SERPL-MCNC: 0.4 MG/DL
PROT SERPL-MCNC: 7.1 G/DL
PSA FREE FLD-MCNC: 51 %
PSA FREE SERPL-MCNC: 0.23 NG/ML
PSA SERPL-MCNC: 0.45 NG/ML
TESTOST SERPL-MCNC: 336 NG/DL

## 2023-07-07 ENCOUNTER — APPOINTMENT (OUTPATIENT)
Dept: UROLOGY | Facility: CLINIC | Age: 53
End: 2023-07-07
Payer: COMMERCIAL

## 2023-07-07 DIAGNOSIS — N52.01 ERECTILE DYSFUNCTION DUE TO ARTERIAL INSUFFICIENCY: ICD-10-CM

## 2023-07-07 DIAGNOSIS — E29.1 TESTICULAR HYPOFUNCTION: ICD-10-CM

## 2023-07-07 DIAGNOSIS — Z12.5 ENCOUNTER FOR SCREENING FOR MALIGNANT NEOPLASM OF PROSTATE: ICD-10-CM

## 2023-07-07 PROCEDURE — 99214 OFFICE O/P EST MOD 30 MIN: CPT

## 2023-07-13 ENCOUNTER — APPOINTMENT (OUTPATIENT)
Dept: CARDIOLOGY | Facility: CLINIC | Age: 53
End: 2023-07-13
Payer: COMMERCIAL

## 2023-07-13 ENCOUNTER — NON-APPOINTMENT (OUTPATIENT)
Age: 53
End: 2023-07-13

## 2023-07-13 PROCEDURE — G2066: CPT

## 2023-07-13 PROCEDURE — 93298 REM INTERROG DEV EVAL SCRMS: CPT

## 2023-08-15 PROBLEM — Z12.5 SCREENING PSA (PROSTATE SPECIFIC ANTIGEN): Status: ACTIVE | Noted: 2021-07-07

## 2023-08-15 PROBLEM — N52.01 ERECTILE DYSFUNCTION DUE TO ARTERIAL INSUFFICIENCY: Status: ACTIVE | Noted: 2022-04-04

## 2023-08-15 PROBLEM — E29.1 TESTICULAR HYPOFUNCTION: Status: ACTIVE | Noted: 2017-04-24

## 2023-08-15 NOTE — ASSESSMENT
[FreeTextEntry1] : 52-year-old male with past medical history of testicular hypofunction and erectile dysfunction.\par \par Most recent labs reviewed.\par Patient continues to do well.\par \par Follow-up 6 months with repeat labs\par Continue Testosterone\par SIldenafil PRN.

## 2023-08-15 NOTE — HISTORY OF PRESENT ILLNESS
[FreeTextEntry1] : Aquilino Gant is a 52 year old male with a past medical history of testicular hypofunction and impotence. Patient was decreased to Testosterone cypionate injections 0.25 mL every 5 days from 0.5 mL every 5 days. Patient presents to office today to review lab work and assess symptoms.   Patient reports that he feels well.  He has good energy, good libido.  He denies any urinary complaints at this visit.  Erectile dysfunction managed on sildenafil 100 mg tablets.  Most recent labs done June 2023 PSA 0.45 Testosterone 336 Hepatic function panel within normal limit Hemoglobin 12.7 and hematocrit 39%  Previously: Jan 2022 Blood work: Testosterone total- 392.0 ng/dL PSA- 0.45 ng/mL AST- 54 ALT- 48 Indirect Bilirubin >0.1 Hemoglobin- 12.3 g/dL  Previous Records: June 2021 blood work: Testosterone Total- 411 ng/dL SHBG- 24 AST- 48 ALT- 41 Hemoglobin- 12.6 L  April 2021 Total T 896 Liver enzymes -- ast 66, alt 54 CBC -- 13.3  2/19/2020 -PSA 0.6 ng/ml, 33 % free PSA -Total testosterone 762 (250-1100), Bioavailable 289.3 (110-575), testosterone free 140.7 ()  1/4/2019.- Trough -hemoglobin 13.4, platelets 207 -Normal LFT -Total T 416 (250-1100), Bioavailable 152.9 (110-575), testosterone free 71.3 ()  1/5/2019.- Peak -PSA 0.6 ng/ml, 33 % Free PSA -Total T 599 (250-1100), Bioavailable 247.6 (110-575), testosterone free 117.9 ()    Dec 20 2023 Complete Blood Count - hgb 12.6 Hepatic Function Panel; AST - 51, ALT - 44 PSA Profile - Total - 0.47 Testosterone, Total - 330

## 2023-08-21 ENCOUNTER — APPOINTMENT (OUTPATIENT)
Dept: ELECTROPHYSIOLOGY | Facility: CLINIC | Age: 53
End: 2023-08-21
Payer: COMMERCIAL

## 2023-08-21 VITALS
TEMPERATURE: 97.1 F | BODY MASS INDEX: 34.07 KG/M2 | DIASTOLIC BLOOD PRESSURE: 76 MMHG | HEART RATE: 92 BPM | WEIGHT: 230 LBS | RESPIRATION RATE: 16 BRPM | HEIGHT: 69 IN | SYSTOLIC BLOOD PRESSURE: 126 MMHG

## 2023-08-21 PROCEDURE — 93291 INTERROG DEV EVAL SCRMS IP: CPT

## 2023-08-22 NOTE — CARDIOLOGY SUMMARY
[de-identified] : ECG (4/25/2022) Sinus rhythm at 85 bpm, no significant ST/T abnormalities\par  ECG (8/23/2021) Sinus rhythm at 83 bpm, no significant ST/T abnormalities\par  ECG (6/21/2021) Sinus rhythm at 85 bpm, no significant ST/T abnormalities\par  ECG (12/22/2020): sinus rhythm at 99 bpm, no significant ST/T abnormalities\par  ECG (10/27/2020): sinus rhythm at 87 bpm, no significant ST/T abnormalities [de-identified] : MCOT (10/27/2020 to 11/25/2020): Duration: 30 days, Average HR 88 bpm, ( bpm), EVENT: \par   -no AF, no SVT, no pause, no AV block\par   -NSVT 14 seconds, 153 bpm (11/12/2020 at 2:11 am) [de-identified] : Cardiac MRI (12/11/2020): Normal EF, no LGE, Pulmonary hypertension, Consolidation possible pneumonia; [de-identified] : EP study (7/28/2021): no inducible SVT or VT; presence of RBBB aberrancy [de-identified] : Cardiac Cath (7/13/2020): minor irregularities\par

## 2023-08-22 NOTE — DISCUSSION/SUMMARY
[FreeTextEntry1] : Mr. Brian Gant is a pleasant 51 year-old man, [employer] , with hypertension, hyperlipidemia, RAYMUNDO on CPAP, referred for tachycardia and NSVT on Holter and EST. He has no significant CAD on cath. He has normal EF on echo, and normal LV function and no LGE on CMR. He has mildly depressed RV function and dilated PA. EP study on 7/28/2021 was negative: no inducible SVT, VT; presence of RBBB aberrancy  He has sudden death Father at age of 68 during sleep;  Patient has no prior syncope.   BP at goal, continue same medications;   continue Metoprolol for NSVT, had 1 narrow complex tachycardia, cw ST with short nsvt. Patient was asymptomatic.  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 Patient  on restricted duty. I interrogated and reprogrammed his device as described in procedure. Patient will follow with me in 12 months' and prn  Please do not hesitate to contact me at [phone] if you have any further questions regarding this patient care.

## 2023-08-22 NOTE — PROCEDURE
[No] : not [NSR] : normal sinus rhythm [Normal] : The battery status is normal. [Sensing Amplitude ___mv] : sensing amplitude was [unfilled] mv [None] : none [Programmed for Longevity] : output reprogrammed for improved battery longevity [de-identified] : 82 BPM [de-identified] : JULIUS [de-identified] : ELIOTQ LNQ11 [de-identified] : TESS [de-identified] : 1 ST with nsvt Transmitting on [facility] -

## 2023-08-29 ENCOUNTER — TRANSCRIPTION ENCOUNTER (OUTPATIENT)
Age: 53
End: 2023-08-29

## 2023-08-30 ENCOUNTER — TRANSCRIPTION ENCOUNTER (OUTPATIENT)
Age: 53
End: 2023-08-30

## 2023-09-14 NOTE — DISCHARGE NOTE PROVIDER - NSDCFUSCHEDAPPT_GEN_ALL_CORE_FT
-- DO NOT REPLY / DO NOT REPLY ALL --  -- Message is from Engagement Center Operations (ECO) --    General Patient Message: Patient daughter returning call she received. Kadi stated patient went to  and have positive UTI and awaiting on cultural to come back for kidney infection.      Caller Information       Type Contact Phone/Fax    09/14/2023 09:46 AM CDT Phone (Incoming) Kadi Cheung (Emergency Contact) [phone]        Alternative phone number: no    Can a detailed message be left? Yes    Message Turnaround:     Is it Working Hours? Yes - Working Hours     [place]:    Please give this turnaround time to the caller:   \"This message will be sent to [state Provider's name]. The clinical team will fulfill your request as soon as they review your message.\"                 JYOTI VÁZQUEZ ; 08/23/2021 ; [facility] [address] [facility] JYOTI Soto ; 09/02/2021 ; [facility] [address] JYOTI VÁZQUEZ ; 09/02/2021 ; [facility] [address]

## 2023-09-15 ENCOUNTER — TRANSCRIPTION ENCOUNTER (OUTPATIENT)
Age: 53
End: 2023-09-15

## 2023-09-20 ENCOUNTER — NON-APPOINTMENT (OUTPATIENT)
Age: 53
End: 2023-09-20

## 2023-09-21 ENCOUNTER — APPOINTMENT (OUTPATIENT)
Dept: CARDIOLOGY | Facility: CLINIC | Age: 53
End: 2023-09-21
Payer: COMMERCIAL

## 2023-09-21 PROCEDURE — 93298 REM INTERROG DEV EVAL SCRMS: CPT

## 2023-09-21 PROCEDURE — G2066: CPT

## 2023-10-02 ENCOUNTER — TRANSCRIPTION ENCOUNTER (OUTPATIENT)
Age: 53
End: 2023-10-02

## 2023-10-25 ENCOUNTER — APPOINTMENT (OUTPATIENT)
Dept: CARDIOLOGY | Facility: CLINIC | Age: 53
End: 2023-10-25
Payer: COMMERCIAL

## 2023-10-25 ENCOUNTER — NON-APPOINTMENT (OUTPATIENT)
Age: 53
End: 2023-10-25

## 2023-10-26 PROCEDURE — 93298 REM INTERROG DEV EVAL SCRMS: CPT

## 2023-10-26 PROCEDURE — G2066: CPT | Mod: NC

## 2023-11-07 ENCOUNTER — TRANSCRIPTION ENCOUNTER (OUTPATIENT)
Age: 53
End: 2023-11-07

## 2023-11-09 ENCOUNTER — RX RENEWAL (OUTPATIENT)
Age: 53
End: 2023-11-09

## 2023-11-29 ENCOUNTER — NON-APPOINTMENT (OUTPATIENT)
Age: 53
End: 2023-11-29

## 2023-11-29 ENCOUNTER — APPOINTMENT (OUTPATIENT)
Dept: CARDIOLOGY | Facility: CLINIC | Age: 53
End: 2023-11-29
Payer: COMMERCIAL

## 2023-11-30 PROCEDURE — 93298 REM INTERROG DEV EVAL SCRMS: CPT

## 2023-11-30 PROCEDURE — G2066: CPT

## 2023-12-22 ENCOUNTER — LABORATORY RESULT (OUTPATIENT)
Age: 53
End: 2023-12-22

## 2023-12-26 ENCOUNTER — TRANSCRIPTION ENCOUNTER (OUTPATIENT)
Age: 53
End: 2023-12-26

## 2023-12-28 LAB
ALBUMIN SERPL ELPH-MCNC: 4.5 G/DL
ALP BLD-CCNC: 50 U/L
ALT SERPL-CCNC: 32 U/L
AST SERPL-CCNC: 42 U/L
BILIRUB DIRECT SERPL-MCNC: <0.2 MG/DL
BILIRUB INDIRECT SERPL-MCNC: >0.2 MG/DL
BILIRUB SERPL-MCNC: 0.4 MG/DL
ESTRADIOL SERPL-MCNC: 15 PG/ML
PROT SERPL-MCNC: 6.8 G/DL
TESTOST SERPL-MCNC: 267 NG/DL

## 2024-01-02 ENCOUNTER — APPOINTMENT (OUTPATIENT)
Dept: CARDIOLOGY | Facility: CLINIC | Age: 54
End: 2024-01-02
Payer: COMMERCIAL

## 2024-01-02 ENCOUNTER — NON-APPOINTMENT (OUTPATIENT)
Age: 54
End: 2024-01-02

## 2024-01-03 PROCEDURE — 93298 REM INTERROG DEV EVAL SCRMS: CPT

## 2024-01-05 ENCOUNTER — APPOINTMENT (OUTPATIENT)
Dept: UROLOGY | Facility: CLINIC | Age: 54
End: 2024-01-05

## 2024-02-06 ENCOUNTER — APPOINTMENT (OUTPATIENT)
Dept: CARDIOLOGY | Facility: CLINIC | Age: 54
End: 2024-02-06
Payer: COMMERCIAL

## 2024-02-06 ENCOUNTER — NON-APPOINTMENT (OUTPATIENT)
Age: 54
End: 2024-02-06

## 2024-02-07 PROCEDURE — 93298 REM INTERROG DEV EVAL SCRMS: CPT

## 2024-02-22 ENCOUNTER — TRANSCRIPTION ENCOUNTER (OUTPATIENT)
Age: 54
End: 2024-02-22

## 2024-03-07 NOTE — ASU PREOP CHECKLIST - PATIENT SENT AT
KNEE ISSUES:       Rest  Ice 20 minutes 3-4 times a day.  Elevate  Limit activities  Gradually  increase activity as tolerated without pain  May use the knee brace for support if helpful     Take your pain medicine as discussed:    Orders Placed This Encounter    XR ANKLE 3 VW LEFT    XR KNEE 4 OR MORE VIEWS LEFT    SERVICE TO ORTHOPEDICS    nabumetone (RELAFEN) 500 MG tablet       MEDICATION PRESCRIBED: NSAID as prescribed.    To ortho in 1-2 weeks if issues persist or worsening symptoms.  Orthopaedics at the [facility] or the [facility] - Phone # is [phone].    Call our office with any issues or concerns.    To ER if worsening symptoms         
22-Oct-2019 10:38

## 2024-03-08 ENCOUNTER — APPOINTMENT (OUTPATIENT)
Dept: UROLOGY | Facility: CLINIC | Age: 54
End: 2024-03-08

## 2024-03-11 ENCOUNTER — TRANSCRIPTION ENCOUNTER (OUTPATIENT)
Age: 54
End: 2024-03-11

## 2024-03-12 ENCOUNTER — APPOINTMENT (OUTPATIENT)
Dept: CARDIOLOGY | Facility: CLINIC | Age: 54
End: 2024-03-12
Payer: COMMERCIAL

## 2024-03-12 ENCOUNTER — NON-APPOINTMENT (OUTPATIENT)
Age: 54
End: 2024-03-12

## 2024-03-12 PROCEDURE — 93298 REM INTERROG DEV EVAL SCRMS: CPT

## 2024-03-14 ENCOUNTER — TRANSCRIPTION ENCOUNTER (OUTPATIENT)
Age: 54
End: 2024-03-14

## 2024-03-19 ENCOUNTER — NON-APPOINTMENT (OUTPATIENT)
Age: 54
End: 2024-03-19

## 2024-03-21 ENCOUNTER — TRANSCRIPTION ENCOUNTER (OUTPATIENT)
Age: 54
End: 2024-03-21

## 2024-03-25 ENCOUNTER — APPOINTMENT (OUTPATIENT)
Dept: ELECTROPHYSIOLOGY | Facility: CLINIC | Age: 54
End: 2024-03-25
Payer: COMMERCIAL

## 2024-03-25 VITALS
HEIGHT: 69 IN | WEIGHT: 235 LBS | BODY MASS INDEX: 34.8 KG/M2 | SYSTOLIC BLOOD PRESSURE: 120 MMHG | HEART RATE: 96 BPM | TEMPERATURE: 97.1 F | DIASTOLIC BLOOD PRESSURE: 80 MMHG

## 2024-03-25 DIAGNOSIS — I10 ESSENTIAL (PRIMARY) HYPERTENSION: ICD-10-CM

## 2024-03-25 DIAGNOSIS — I47.29 OTHER VENTRICULAR TACHYCARDIA: ICD-10-CM

## 2024-03-25 DIAGNOSIS — R00.0 TACHYCARDIA, UNSPECIFIED: ICD-10-CM

## 2024-03-25 DIAGNOSIS — Z45.09 ENCOUNTER FOR ADJUSTMENT AND MANAGEMENT OF OTHER CARDIAC DEVICE: ICD-10-CM

## 2024-03-25 DIAGNOSIS — G47.33 OBSTRUCTIVE SLEEP APNEA (ADULT) (PEDIATRIC): ICD-10-CM

## 2024-03-25 PROCEDURE — 99214 OFFICE O/P EST MOD 30 MIN: CPT

## 2024-03-25 PROCEDURE — 93285 PRGRMG DEV EVAL SCRMS IP: CPT

## 2024-03-25 RX ORDER — SILDENAFIL 100 MG/1
100 TABLET, FILM COATED ORAL
Qty: 18 | Refills: 2 | Status: ACTIVE | COMMUNITY
Start: 2017-07-10

## 2024-03-25 RX ORDER — SYRINGE, DISPOSABLE, 1 ML
21G X 1" SYRINGE, EMPTY DISPOSABLE MISCELLANEOUS
Qty: 8 | Refills: 5 | Status: COMPLETED | COMMUNITY
Start: 2017-07-10 | End: 2024-03-25

## 2024-03-25 RX ORDER — AMLODIPINE BESYLATE 10 MG/1
10 TABLET ORAL DAILY
Qty: 30 | Refills: 5 | Status: ACTIVE | COMMUNITY

## 2024-03-25 RX ORDER — VALSARTAN AND HYDROCHLOROTHIAZIDE 320; 25 MG/1; MG/1
320-25 TABLET, FILM COATED ORAL DAILY
Refills: 0 | Status: ACTIVE | COMMUNITY

## 2024-03-25 RX ORDER — COLESEVELAM HYDROCHLORIDE 625 MG/1
625 TABLET, FILM COATED ORAL TWICE DAILY
Refills: 0 | Status: ACTIVE | COMMUNITY

## 2024-03-25 RX ORDER — TESTOSTERONE CYPIONATE 200 MG/ML
200 INJECTION, SOLUTION INTRAMUSCULAR
Qty: 6 | Refills: 0 | Status: ACTIVE | COMMUNITY
Start: 2019-09-24

## 2024-03-25 RX ORDER — FOLIC ACID 1 MG/1
1 TABLET ORAL DAILY
Refills: 0 | Status: ACTIVE | COMMUNITY

## 2024-03-25 NOTE — HISTORY OF PRESENT ILLNESS
[FreeTextEntry1] : Referring Internist: Dr. Compa Bob Referring Cardiologist: Dr. Sylvain Asher   Hypertension, hyperlipidemia, RAYMUNDO on CPAP MTA ; had tachycardia on Physical referred for cardiac work-up: Holter showed 3 beats NSVT EST showed NSVT on exertion Father sudden death during sleep at age 68 CMR showed dilated pulmonary artery MCOT showed NSVT 14 seconds, 153 bpm (11/12/2020 at 2:11 am) EP study on 7/28/2021: no inducible SVT or VT 4/25/2022: 67 AT episodes recorded showing sinus tachycardia rates 100-143 BPM, no symptoms  Patient has no prior syncope, no palpitations; He has no chest pain, no shortness of breath, no dyspnea on exertion, no orthopnea, no PND. He denies dizziness, lightheadedness and syncope. He has no exertional symptoms.

## 2024-03-25 NOTE — PROCEDURE
[No] : not [NSR] : normal sinus rhythm [Normal] : The battery status is normal. [Sensing Amplitude ___mv] : sensing amplitude was [unfilled] mv [None] : none [Programmed for Longevity] : output reprogrammed for improved battery longevity [Counters Reset] : the counters were reset [de-identified] : JULIUS [de-identified] : 88 BPM [de-identified] : [id number] [de-identified] : ELIOTQ LNQ11 [de-identified] : 3 tachy episodes c/w ST, PVC with avg. v-rate = 158bpm. Transmitting on [employer]   [de-identified] : Good  [de-identified] : 02-

## 2024-03-25 NOTE — DISCUSSION/SUMMARY
[FreeTextEntry1] : Mr. Brian Gant is a pleasant 53 year-old man, [employer] , with hypertension, hyperlipidemia, RAYMUNDO on CPAP, referred for tachycardia and NSVT on Holter and EST. He has no significant CAD on cath. He has normal EF on echo, and normal LV function and no LGE on CMR. He has mildly depressed RV function and dilated PA. EP study on 2021 was negative: no inducible SVT, VT; presence of RBBB aberrancy.  He has a family h/o sudden death: Father  at age of 68 during sleep.  Patient has no prior syncope.   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 Patient on restricted duty.  I interrogated and reprogrammed his device as described in procedure. 3 Tachy events noted from Sept and 2023 with no recent events.   We spoke about pending ILR battery depletion, as ILR is now >3 years old, and ILR removal process. Patient is ok with being called and scheduled for ILR removal once RRT is reached.  Patient will follow with me in 6 months' time and PRN. Please do not hesitate to contact me at [phone] if you have any further questions regarding this patient care.

## 2024-03-25 NOTE — PHYSICAL EXAM
[General Appearance - Well Developed] : well developed [Normal Appearance] : normal appearance [Well Groomed] : well groomed [General Appearance - Well Nourished] : well nourished [No Deformities] : no deformities [General Appearance - In No Acute Distress] : no acute distress [Heart Rate And Rhythm] : heart rate and rhythm were normal [Heart Sounds] : normal S1 and S2 [Exaggerated Use Of Accessory Muscles For Inspiration] : no accessory muscle use [Respiration, Rhythm And Depth] : normal respiratory rhythm and effort [Dry] : dry [Clean] : clean [Well-Healed] : well-healed [Abdomen Soft] : soft [Nail Clubbing] : no clubbing of the fingernails [Petechial Hemorrhages (___cm)] : no petechial hemorrhages [Cyanosis, Localized] : no localized cyanosis [Normal Conjunctiva] : the conjunctiva exhibited no abnormalities [Eyelids - No Xanthelasma] : the eyelids demonstrated no xanthelasmas [Normal Oral Mucosa] : normal oral mucosa [Abnormal Walk] : normal gait [Skin Color & Pigmentation] : normal skin color and pigmentation [Gait - Sufficient For Exercise Testing] : the gait was sufficient for exercise testing [] : no rash [No Venous Stasis] : no venous stasis [Skin Lesions] : no skin lesions [No Skin Ulcers] : no skin ulcer [No Xanthoma] : no  xanthoma was observed [Oriented To Time, Place, And Person] : oriented to person, place, and time [Mood] : the mood was normal [Affect] : the affect was normal [No Anxiety] : not feeling anxious [FreeTextEntry1] : No JVD

## 2024-03-25 NOTE — CARDIOLOGY SUMMARY
[de-identified] : ECG (4/25/2022) Sinus rhythm at 85 bpm, no significant ST/T abnormalities\par  ECG (8/23/2021) Sinus rhythm at 83 bpm, no significant ST/T abnormalities\par  ECG (6/21/2021) Sinus rhythm at 85 bpm, no significant ST/T abnormalities\par  ECG (12/22/2020): sinus rhythm at 99 bpm, no significant ST/T abnormalities\par  ECG (10/27/2020): sinus rhythm at 87 bpm, no significant ST/T abnormalities [de-identified] : MCOT (10/27/2020 to 11/25/2020): Duration: 30 days, Average HR 88 bpm, ( bpm), EVENT: \par   -no AF, no SVT, no pause, no AV block\par   -NSVT 14 seconds, 153 bpm (11/12/2020 at 2:11 am) [de-identified] : Cardiac MRI (12/11/2020): Normal EF, no LGE, Pulmonary hypertension, Consolidation possible pneumonia; [de-identified] : EP study (7/28/2021): no inducible SVT or VT; presence of RBBB aberrancy [de-identified] : Cardiac Cath (7/13/2020): minor irregularities\par

## 2024-04-29 ENCOUNTER — APPOINTMENT (OUTPATIENT)
Dept: CARDIOLOGY | Facility: CLINIC | Age: 54
End: 2024-04-29
Payer: COMMERCIAL

## 2024-04-29 ENCOUNTER — NON-APPOINTMENT (OUTPATIENT)
Age: 54
End: 2024-04-29

## 2024-04-29 PROCEDURE — 93298 REM INTERROG DEV EVAL SCRMS: CPT

## 2024-05-06 ENCOUNTER — RX RENEWAL (OUTPATIENT)
Age: 54
End: 2024-05-06

## 2024-05-06 RX ORDER — METOPROLOL TARTRATE 50 MG/1
50 TABLET, FILM COATED ORAL
Qty: 180 | Refills: 3 | Status: ACTIVE | COMMUNITY
Start: 2021-05-28 | End: 1900-01-01

## 2024-05-17 NOTE — CARDIOLOGY SUMMARY
[de-identified] : (12/19/2022) Sinus rhythm at 80 bpm, no significant ST/T abnormalities\par (4/25/2022) Sinus rhythm at 85 bpm, no significant ST/T abnormalities\par (8/23/2021) Sinus rhythm at 83 bpm, no significant ST/T abnormalities\par (6/21/2021) Sinus rhythm at 85 bpm, no significant ST/T abnormalities\par (12/22/2020): sinus rhythm at 99 bpm, no significant ST/T abnormalities\par (10/27/2020): sinus rhythm at 87 bpm, no significant ST/T abnormalities [de-identified] : MCOT (10/27/2020 to 11/25/2020): Duration: 30 days, Average HR 88 bpm, ( bpm), EVENT: \par  -no AF, no SVT, no pause, no AV block\par  -NSVT 14 seconds, 153 bpm (11/12/2020 at 2:11 am) [de-identified] : Cardiac MRI (12/11/2020): Normal EF, no LGE, Pulmonary hypertension, Consolidation possible pneumonia; [de-identified] : EP study (7/28/2021): no inducible SVT or VT; presence of RBBB aberrancy [de-identified] : Cardiac Cath (7/13/2020): minor irregularities\par  (2) Within 48 hours

## 2024-06-03 ENCOUNTER — APPOINTMENT (OUTPATIENT)
Dept: CARDIOLOGY | Facility: CLINIC | Age: 54
End: 2024-06-03
Payer: COMMERCIAL

## 2024-06-03 ENCOUNTER — NON-APPOINTMENT (OUTPATIENT)
Age: 54
End: 2024-06-03

## 2024-06-03 PROCEDURE — 93298 REM INTERROG DEV EVAL SCRMS: CPT

## 2024-06-20 ENCOUNTER — APPOINTMENT (OUTPATIENT)
Dept: UROLOGY | Facility: CLINIC | Age: 54
End: 2024-06-20

## 2024-07-05 ENCOUNTER — NON-APPOINTMENT (OUTPATIENT)
Age: 54
End: 2024-07-05

## 2024-07-05 ENCOUNTER — APPOINTMENT (OUTPATIENT)
Dept: CARDIOLOGY | Facility: CLINIC | Age: 54
End: 2024-07-05
Payer: COMMERCIAL

## 2024-07-05 PROCEDURE — 93298 REM INTERROG DEV EVAL SCRMS: CPT

## 2024-07-29 ENCOUNTER — APPOINTMENT (OUTPATIENT)
Dept: UROLOGY | Facility: CLINIC | Age: 54
End: 2024-07-29
Payer: COMMERCIAL

## 2024-07-29 VITALS
BODY MASS INDEX: 33.77 KG/M2 | RESPIRATION RATE: 17 BRPM | SYSTOLIC BLOOD PRESSURE: 133 MMHG | HEART RATE: 92 BPM | OXYGEN SATURATION: 95 % | WEIGHT: 228 LBS | DIASTOLIC BLOOD PRESSURE: 81 MMHG | TEMPERATURE: 97.5 F | HEIGHT: 69 IN

## 2024-07-29 DIAGNOSIS — E29.1 TESTICULAR HYPOFUNCTION: ICD-10-CM

## 2024-07-29 DIAGNOSIS — N52.01 ERECTILE DYSFUNCTION DUE TO ARTERIAL INSUFFICIENCY: ICD-10-CM

## 2024-07-29 PROCEDURE — G2211 COMPLEX E/M VISIT ADD ON: CPT | Mod: NC

## 2024-07-29 PROCEDURE — 99215 OFFICE O/P EST HI 40 MIN: CPT

## 2024-07-29 RX ORDER — NEEDLES, DISPOSABLE 25GX5/8"
23G X 1" NEEDLE, DISPOSABLE MISCELLANEOUS
Qty: 30 | Refills: 0 | Status: ACTIVE | COMMUNITY
Start: 2024-07-29 | End: 1900-01-01

## 2024-07-29 NOTE — ASSESSMENT
[FreeTextEntry1] : He is relatively stable but I have no labs for a long time we also discussed the subcutaneous use as he finds I am necessary but unpleasant.  He will get the 23-gauge needles and 1 mL syringe all right him for 2 bottles on the 1 mL of the testosterone cypionate 200 mg/mL in the meantime he will get baseline blood so I will know if he really needs something as I have no bloods and over 6 months.  He will come and we will teach him how to do it and then we will get trough and peak.  As far as the sildenafil he tells me he can walk miles and miles and miles climbs stairs he has no shortness of breath there is no evidence of chest pain I will renew the sildenafil at the 100 mg dose

## 2024-07-29 NOTE — PHYSICAL EXAM
[General Appearance - Well Developed] : well developed [General Appearance - Well Nourished] : well nourished [Normal Appearance] : normal appearance [Well Groomed] : well groomed [General Appearance - In No Acute Distress] : no acute distress [Heart Rate And Rhythm] : heart rate and rhythm were normal [Edema] : no peripheral edema [Respiration, Rhythm And Depth] : normal respiratory rhythm and effort [Exaggerated Use Of Accessory Muscles For Inspiration] : no accessory muscle use [Auscultation Breath Sounds / Voice Sounds] : lungs were clear to auscultation bilaterally [Abdomen Soft] : soft [Abdomen Tenderness] : non-tender [Costovertebral Angle Tenderness] : no ~M costovertebral angle tenderness [Normal Station and Gait] : the gait and station were normal for the patient's age [] : no rash [No Focal Deficits] : no focal deficits [Oriented To Time, Place, And Person] : oriented to person, place, and time [Affect] : the affect was normal [Mood] : the mood was normal [Not Anxious] : not anxious [FreeTextEntry1] : 33.67

## 2024-07-29 NOTE — HISTORY OF PRESENT ILLNESS
[FreeTextEntry1] : Brian is a 53-year-old male born November 19, 1970 who had been under the care of Dr. Haddad with testosterone replacement therapy as per the last note which was over a year ago he was getting 0.25 mL every 5 days.  Somehow he ended up with enough testosterone to inject all the way through June which was almost a year without being seen he did have an appointment to see me but missed it and now comes it.  He tells me that without the testosterone he is feeling pretty low.    He also has ED which is being treated with sildenafil at the 100 mg dose they usually have sex once or twice a week 6 times a month and that is enough for both of them.  He denies trouble with urination, no blood, no pus, no burning, no frequency / urgency, no incontinence and no  nocturia etc.  As long as he has the testosterone and sildenafil on board his erections are more than good enough for both of them.  He has not had testosterone levels checked since December he tells me he had bloods drawn by his PCP 4 months ago that was within normal limits, but his PSA has not been checked in years. [Erectile Dysfunction] : Erectile Dysfunction

## 2024-07-29 NOTE — LETTER BODY
[Dear  ___] : Dear  [unfilled], [Courtesy Letter:] : I had the pleasure of seeing your patient, [unfilled], in my office today. [Please see my note below.] : Please see my note below. [Sincerely,] : Sincerely, [FreeTextEntry2] : Compa Bob MD [address] [id number]

## 2024-07-29 NOTE — ADDENDUM
[FreeTextEntry1] : What looked over the testing he did not done back in December the care he received from Dr. Haddad we discussed the differences between my treatments of testosterone insufficiency and that of Dr. Haddad's including the off label use of subcutaneous testosterone cypionate which hurts less and and micromanaged gives a smoother testosterone level.  We discussed his cardiac reserve and renewed his sildenafil.  We also discussed the risks of sildenafil and will patient

## 2024-07-29 NOTE — LETTER HEADER
[FreeTextEntry3] : Philippe Galvin MD [facility] [facility], [address] [place] [address]

## 2024-08-01 LAB
ALBUMIN SERPL ELPH-MCNC: 4.6 G/DL
ALP BLD-CCNC: 46 U/L
ALT SERPL-CCNC: 33 U/L
AST SERPL-CCNC: 44 U/L
BASOPHILS # BLD AUTO: 0.05 K/UL
BASOPHILS NFR BLD AUTO: 0.7 %
BILIRUB DIRECT SERPL-MCNC: <0.2 MG/DL
BILIRUB INDIRECT SERPL-MCNC: >0.2 MG/DL
BILIRUB SERPL-MCNC: 0.4 MG/DL
EOSINOPHIL # BLD AUTO: 0.29 K/UL
EOSINOPHIL NFR BLD AUTO: 4.1 %
ESTRADIOL SERPL-MCNC: <5 PG/ML
HCT VFR BLD CALC: 40.3 %
HGB BLD-MCNC: 13 G/DL
IMM GRANULOCYTES NFR BLD AUTO: 0.6 %
LH SERPL-ACNC: 5.7 IU/L
LYMPHOCYTES # BLD AUTO: 1.16 K/UL
LYMPHOCYTES NFR BLD AUTO: 16.5 %
MAN DIFF?: NORMAL
MCHC RBC-ENTMCNC: 29.2 PG
MCHC RBC-ENTMCNC: 32.3 G/DL
MCV RBC AUTO: 90.6 FL
MONOCYTES # BLD AUTO: 0.58 K/UL
MONOCYTES NFR BLD AUTO: 8.3 %
NEUTROPHILS # BLD AUTO: 4.89 K/UL
NEUTROPHILS NFR BLD AUTO: 69.8 %
PLATELET # BLD AUTO: 174 K/UL
PMV BLD AUTO: 0 /100 WBCS
PROLACTIN SERPL-MCNC: 9 NG/ML
PROT SERPL-MCNC: 7.1 G/DL
PSA FREE FLD-MCNC: 50 %
PSA FREE SERPL-MCNC: 0.17 NG/ML
PSA SERPL-MCNC: 0.35 NG/ML
RBC # BLD: 4.45 M/UL
RBC # FLD: 13.6 %
SHBG SERPL-SCNC: 32 NMOL/L
WBC # FLD AUTO: 7.01 K/UL

## 2024-08-02 ENCOUNTER — APPOINTMENT (OUTPATIENT)
Dept: UROLOGY | Facility: CLINIC | Age: 54
End: 2024-08-02
Payer: COMMERCIAL

## 2024-08-02 VITALS
SYSTOLIC BLOOD PRESSURE: 128 MMHG | DIASTOLIC BLOOD PRESSURE: 77 MMHG | HEART RATE: 92 BPM | OXYGEN SATURATION: 97 % | WEIGHT: 228 LBS | HEIGHT: 69 IN | BODY MASS INDEX: 33.77 KG/M2

## 2024-08-02 PROCEDURE — G2211 COMPLEX E/M VISIT ADD ON: CPT | Mod: NC

## 2024-08-02 PROCEDURE — 99214 OFFICE O/P EST MOD 30 MIN: CPT

## 2024-08-02 RX ORDER — SYRINGE, DISPOSABLE, 1 ML
1 ML SYRINGE, EMPTY DISPOSABLE MISCELLANEOUS
Qty: 30 | Refills: 0 | Status: ACTIVE | OUTPATIENT
Start: 2024-07-29

## 2024-08-07 NOTE — LETTER BODY
[Dear  ___] : Dear  [unfilled], [Courtesy Letter:] : I had the pleasure of seeing your patient, [unfilled], in my office today. [Please see my note below.] : Please see my note below. [Sincerely,] : Sincerely, [FreeTextEntry2] : Compa Bob MD [address] [id number]

## 2024-08-07 NOTE — HISTORY OF PRESENT ILLNESS
[FreeTextEntry1] : Brian is a 53-year-old male born November 19, 1970 who had been under the care of Dr. Haddad with testosterone replacement therapy as per the last note which was over a year ago he was getting 0.25 mL every 5 days. I saw him 07/29/2024  He has not had labs for a while we will do blood tests and given that he really does not like IM injections we discussed the option of off label use of the subcutaneous fashion.  Blood test were done July 30 Hemoglobin was only 13 but he has been low like that for over 2 years something he will discuss with his PCP, platelet count was 174,000 Liver function test had minimal elevation of the SGOT at 44 Estradiol was undetectable at less than 5 PSA was 0.35/50% Prolactin was 9 LH was 5.7 Sex hormone binding globulin was 32  Testosterone is still pending but given his estradiol and the fact that in December 2023 when his estradiol was 15 his testosterone was low we will going to assume it is low and continue on

## 2024-08-07 NOTE — ADDENDUM
[FreeTextEntry1] : We went to draw up the medication and found that the pharmacy gave him a 3 mL syringe though we had written 1 mL syringe - do not substitute.  He will get the syringes and email me once he has some we will get him back in and teach him how to do it.  Monday is not possible we are just wait too busy but we can try for Wednesday Thursday or Friday.  As far as the blood slips the bloods will be in September because he is going to be away for 2 weeks and  I will be coming back in early september

## 2024-08-07 NOTE — LETTER BODY
[Dear  ___] : Dear  [unfilled], [Courtesy Letter:] : I had the pleasure of seeing your patient, [unfilled], in my office today. [Please see my note below.] : Please see my note below. [Sincerely,] : Sincerely, [FreeTextEntry2] : Compa Bob MD [address] [phone]

## 2024-08-07 NOTE — LETTER HEADER
[FreeTextEntry3] : Philippe Galvin MD [facility]1 [facility], [address] [place] [address]

## 2024-08-07 NOTE — LETTER HEADER
[FreeTextEntry3] : Philippe Galvin MD [facility]1 [facility], [address] [place] [address]

## 2024-08-07 NOTE — ASSESSMENT
[FreeTextEntry1] : He was injecting 0.25 every 5 days which means in 5 weeks he was injecting 1.75 mL which is 0.35 mL/week.  I will have him give himself 0.2 mL twice a week and we will get trough and peak bloods just before and 2 days after the third dose.

## 2024-08-08 ENCOUNTER — APPOINTMENT (OUTPATIENT)
Dept: UROLOGY | Facility: CLINIC | Age: 54
End: 2024-08-08

## 2024-08-08 PROBLEM — E29.1 ANDROGEN DEFICIENCY: Status: ACTIVE | Noted: 2024-07-29

## 2024-08-08 LAB
TESTOSTERONE FREE/WEAKLY BND: 31.5 NG/DL
TESTOSTERONE TOTAL S: 235 NG/DL
TESTOSTERONE, % FREE/WEAKLY BND: 13.4 %

## 2024-08-08 PROCEDURE — 99214 OFFICE O/P EST MOD 30 MIN: CPT

## 2024-08-08 PROCEDURE — G2211 COMPLEX E/M VISIT ADD ON: CPT | Mod: NC

## 2024-08-09 ENCOUNTER — NON-APPOINTMENT (OUTPATIENT)
Age: 54
End: 2024-08-09

## 2024-08-09 ENCOUNTER — APPOINTMENT (OUTPATIENT)
Dept: CARDIOLOGY | Facility: CLINIC | Age: 54
End: 2024-08-09

## 2024-08-09 PROCEDURE — 93298 REM INTERROG DEV EVAL SCRMS: CPT

## 2024-08-19 ENCOUNTER — APPOINTMENT (OUTPATIENT)
Dept: ELECTROPHYSIOLOGY | Facility: CLINIC | Age: 54
End: 2024-08-19

## 2024-09-13 ENCOUNTER — APPOINTMENT (OUTPATIENT)
Dept: CARDIOLOGY | Facility: CLINIC | Age: 54
End: 2024-09-13

## 2024-09-22 ENCOUNTER — NON-APPOINTMENT (OUTPATIENT)
Age: 54
End: 2024-09-22

## 2024-09-23 ENCOUNTER — APPOINTMENT (OUTPATIENT)
Dept: UROLOGY | Facility: CLINIC | Age: 54
End: 2024-09-23
Payer: COMMERCIAL

## 2024-09-23 VITALS
WEIGHT: 233 LBS | TEMPERATURE: 98 F | BODY MASS INDEX: 34.51 KG/M2 | SYSTOLIC BLOOD PRESSURE: 136 MMHG | HEIGHT: 69 IN | HEART RATE: 93 BPM | DIASTOLIC BLOOD PRESSURE: 86 MMHG

## 2024-09-23 DIAGNOSIS — N52.01 ERECTILE DYSFUNCTION DUE TO ARTERIAL INSUFFICIENCY: ICD-10-CM

## 2024-09-23 DIAGNOSIS — E29.1 TESTICULAR HYPOFUNCTION: ICD-10-CM

## 2024-09-23 PROCEDURE — 99214 OFFICE O/P EST MOD 30 MIN: CPT

## 2024-09-23 PROCEDURE — G2211 COMPLEX E/M VISIT ADD ON: CPT | Mod: NC

## 2024-09-23 NOTE — PHYSICAL EXAM
[General Appearance - Well Developed] : well developed [General Appearance - Well Nourished] : well nourished [Normal Appearance] : normal appearance [Well Groomed] : well groomed [General Appearance - In No Acute Distress] : no acute distress [Heart Rate And Rhythm] : heart rate and rhythm were normal [Edema] : no peripheral edema [Respiration, Rhythm And Depth] : normal respiratory rhythm and effort [Exaggerated Use Of Accessory Muscles For Inspiration] : no accessory muscle use [Auscultation Breath Sounds / Voice Sounds] : lungs were clear to auscultation bilaterally [Abdomen Soft] : soft [Abdomen Tenderness] : non-tender [Costovertebral Angle Tenderness] : no ~M costovertebral angle tenderness [Normal Station and Gait] : the gait and station were normal for the patient's age [] : no rash [No Focal Deficits] : no focal deficits [Oriented To Time, Place, And Person] : oriented to person, place, and time [Affect] : the affect was normal [Mood] : the mood was normal [Not Anxious] : not anxious [Chaperone Declined] : Patient declined chaperone [FreeTextEntry1] : 34.41

## 2024-09-23 NOTE — ASSESSMENT
[FreeTextEntry1] : .His numbers are great he is feeling good he has no problems we will going to renew the medication get bloods in 2-1/2 see me in 3 months he is aware that I we will probably be leaving [place] well in April or May of this year and he will either see whoever takes over my practice so choose another office.  I may end up being here part-time that has yet to be determined

## 2024-09-23 NOTE — LETTER BODY
[Dear  ___] : Dear  [unfilled], [Courtesy Letter:] : I had the pleasure of seeing your patient, [unfilled], in my office today. [Please see my note below.] : Please see my note below. [Sincerely,] : Sincerely, [FreeTextEntry2] : Compa Bob MD [address] [phone]

## 2024-09-23 NOTE — LETTER HEADER
[FreeTextEntry3] : Philippe Galvin MD [facility]1 [facility], [address] [place] [address]

## 2024-09-23 NOTE — HISTORY OF PRESENT ILLNESS
[Erectile Dysfunction] : Erectile Dysfunction [FreeTextEntry1] : Brian is a 53-year-old male born November 19, 1970 who was last seen 08/2/2024. At the last visit we put him on the off label subcutaneous use of testosterone cypionate and he was post to inject 0.2 mL Monday mornings and Thursday evenings. He tells me he is feeling good he is [employer] of energy is not having any problems and sexually he is doing very well using sildenafil 100 mg about once a week.  We wanted trough and peak bloods and is here for review.  Please note the pharmacy had given him 3 mL syringes we had him get 1 mL he was taught how to do it on August 8, had bloods done on September 9 and 11 Estradiol was 26 and 25 Sex hormone binding globulin was 30.5 and 31.9 Testosterone was 557 and 634 Bioavailable testosterone was 108.6 and 146.5 Hemoglobin was 13.5 which is better than he has been in the past with a platelet count of 176,000 Liver function tests were normal

## 2024-09-30 ENCOUNTER — APPOINTMENT (OUTPATIENT)
Dept: ELECTROPHYSIOLOGY | Facility: CLINIC | Age: 54
End: 2024-09-30
Payer: COMMERCIAL

## 2024-09-30 VITALS
WEIGHT: 235 LBS | HEART RATE: 81 BPM | BODY MASS INDEX: 34.8 KG/M2 | TEMPERATURE: 98 F | SYSTOLIC BLOOD PRESSURE: 122 MMHG | DIASTOLIC BLOOD PRESSURE: 78 MMHG | HEIGHT: 69 IN

## 2024-09-30 DIAGNOSIS — Z45.09 ENCOUNTER FOR ADJUSTMENT AND MANAGEMENT OF OTHER CARDIAC DEVICE: ICD-10-CM

## 2024-09-30 DIAGNOSIS — I10 ESSENTIAL (PRIMARY) HYPERTENSION: ICD-10-CM

## 2024-09-30 DIAGNOSIS — I47.29 OTHER VENTRICULAR TACHYCARDIA: ICD-10-CM

## 2024-09-30 PROCEDURE — 99214 OFFICE O/P EST MOD 30 MIN: CPT

## 2024-09-30 PROCEDURE — 93285 PRGRMG DEV EVAL SCRMS IP: CPT

## 2024-09-30 NOTE — PHYSICAL EXAM
[General Appearance - Well Developed] : well developed [Normal Appearance] : normal appearance [Well Groomed] : well groomed [General Appearance - Well Nourished] : well nourished [No Deformities] : no deformities [General Appearance - In No Acute Distress] : no acute distress [Heart Rate And Rhythm] : heart rate and rhythm were normal [Heart Sounds] : normal S1 and S2 [Respiration, Rhythm And Depth] : normal respiratory rhythm and effort [Exaggerated Use Of Accessory Muscles For Inspiration] : no accessory muscle use [Clean] : clean [Dry] : dry [Well-Healed] : well-healed [Abdomen Soft] : soft [Nail Clubbing] : no clubbing of the fingernails [Cyanosis, Localized] : no localized cyanosis [Petechial Hemorrhages (___cm)] : no petechial hemorrhages [Normal Conjunctiva] : the conjunctiva exhibited no abnormalities [Eyelids - No Xanthelasma] : the eyelids demonstrated no xanthelasmas [Normal Oral Mucosa] : normal oral mucosa [Abnormal Walk] : normal gait [Gait - Sufficient For Exercise Testing] : the gait was sufficient for exercise testing [Skin Color & Pigmentation] : normal skin color and pigmentation [] : no rash [No Venous Stasis] : no venous stasis [Skin Lesions] : no skin lesions [No Skin Ulcers] : no skin ulcer [No Xanthoma] : no  xanthoma was observed [Oriented To Time, Place, And Person] : oriented to person, place, and time [Affect] : the affect was normal [Mood] : the mood was normal [No Anxiety] : not feeling anxious [FreeTextEntry1] : No JVD

## 2024-09-30 NOTE — PROCEDURE
[No] : not [NSR] : normal sinus rhythm [Normal] : The battery status is normal. [None] : none [Programmed for Longevity] : output reprogrammed for improved battery longevity [Counters Reset] : the counters were reset [See Device Printout] : See device printout [Sensing Amplitude ___mv] : sensing amplitude was [unfilled] mv [de-identified] : JULIUS [de-identified] : 88 BPM [de-identified] : ELIOTQ LNQ11 [de-identified] : [id number] [de-identified] : 02- [de-identified] : Good  [de-identified] : 2 tachy episodes c/w ST, PVC with avg. v-rate = 158bpm. Transmitting on [employer]

## 2024-09-30 NOTE — CARDIOLOGY SUMMARY
[de-identified] : ECG (4/25/2022) Sinus rhythm at 85 bpm, no significant ST/T abnormalities\par  ECG (8/23/2021) Sinus rhythm at 83 bpm, no significant ST/T abnormalities\par  ECG (6/21/2021) Sinus rhythm at 85 bpm, no significant ST/T abnormalities\par  ECG (12/22/2020): sinus rhythm at 99 bpm, no significant ST/T abnormalities\par  ECG (10/27/2020): sinus rhythm at 87 bpm, no significant ST/T abnormalities [de-identified] : MCOT (10/27/2020 to 11/25/2020): Duration: 30 days, Average HR 88 bpm, ( bpm), EVENT: \par   -no AF, no SVT, no pause, no AV block\par   -NSVT 14 seconds, 153 bpm (11/12/2020 at 2:11 am) [de-identified] : Cardiac MRI (12/11/2020): Normal EF, no LGE, Pulmonary hypertension, Consolidation possible pneumonia; [de-identified] : EP study (7/28/2021): no inducible SVT or VT; presence of RBBB aberrancy [de-identified] : Cardiac Cath (7/13/2020): minor irregularities\par

## 2024-09-30 NOTE — DISCUSSION/SUMMARY
[FreeTextEntry1] : Mr. Brian Gant is a pleasant 53 year-old man, [facility] , with hypertension, hyperlipidemia, RAYMUNDO on CPAP, referred for tachycardia and NSVT on Holter and EST. He has no significant CAD on cath. He has normal EF on echo, and normal LV function and no LGE on CMR. He has mildly depressed RV function and dilated PA. EP study on 2021 was negative: no inducible SVT, VT; presence of RBBB aberrancy.  He has a family h/o sudden death: Father  at age of 68 during sleep.  Patient has no prior syncope.   BP at goal, continue same medications;   continue Metoprolol for h/o NSVT  Patient is now retired from [employer].  I interrogated and reprogrammed his device as described in procedure. 2 tachy episodes c/w ST, PVC with avg. v-rate = 158bpm. - pt asymptomatic.  ILR has reached RRT 2024. Spoke with patient about options including removal, abandonment. ILR removal procedure risks such as infection, bleeding were discussed. Pt wishes to abandon ILR at this time. He knows he can call the office at anytime to have it removed.  Patient will follow with Dr. Walters in 6-7 months' time and PRN. Please do not hesitate to contact me at [phone] if you have any further questions regarding this patient care.

## 2024-09-30 NOTE — HISTORY OF PRESENT ILLNESS
[FreeTextEntry1] : Referring Internist: Dr. Compa Bob Referring Cardiologist: Dr. Sylvain Asher   Hypertension, hyperlipidemia, RAYMUNDO on CPAP [facility] ; had tachycardia on Physical referred for cardiac work-up: Holter showed 3 beats NSVT EST showed NSVT on exertion Father sudden death during sleep at age 68 CMR showed dilated pulmonary artery MCOT showed NSVT 14 seconds, 153 bpm (11/12/2020 at 2:11 am) EP study on 7/28/2021: no inducible SVT or VT 4/25/2022: 66 AT episodes recorded showing sinus tachycardia rates 100-143 BPM, no symptoms  Patient is now retired from [employer].  Patient has no prior syncope, no palpitations; He has no chest pain, no shortness of breath, no dyspnea on exertion, no orthopnea, no PND. He denies dizziness, lightheadedness and syncope. He has no exertional symptoms.

## 2024-10-18 ENCOUNTER — APPOINTMENT (OUTPATIENT)
Dept: CARDIOLOGY | Facility: CLINIC | Age: 54
End: 2024-10-18

## 2024-12-27 ENCOUNTER — APPOINTMENT (OUTPATIENT)
Dept: UROLOGY | Facility: CLINIC | Age: 54
End: 2024-12-27
Payer: COMMERCIAL

## 2024-12-27 VITALS
HEIGHT: 69 IN | HEART RATE: 78 BPM | DIASTOLIC BLOOD PRESSURE: 76 MMHG | BODY MASS INDEX: 32.58 KG/M2 | SYSTOLIC BLOOD PRESSURE: 142 MMHG | WEIGHT: 220 LBS | OXYGEN SATURATION: 95 %

## 2024-12-27 DIAGNOSIS — E29.1 TESTICULAR HYPOFUNCTION: ICD-10-CM

## 2024-12-27 PROCEDURE — G2211 COMPLEX E/M VISIT ADD ON: CPT | Mod: NC

## 2024-12-27 PROCEDURE — 99214 OFFICE O/P EST MOD 30 MIN: CPT

## 2025-03-24 ENCOUNTER — APPOINTMENT (OUTPATIENT)
Dept: UROLOGY | Facility: CLINIC | Age: 55
End: 2025-03-24
Payer: COMMERCIAL

## 2025-03-24 VITALS
HEIGHT: 69 IN | BODY MASS INDEX: 34.51 KG/M2 | SYSTOLIC BLOOD PRESSURE: 148 MMHG | HEART RATE: 80 BPM | DIASTOLIC BLOOD PRESSURE: 84 MMHG | WEIGHT: 233 LBS

## 2025-03-24 DIAGNOSIS — E29.1 TESTICULAR HYPOFUNCTION: ICD-10-CM

## 2025-03-24 PROCEDURE — 99214 OFFICE O/P EST MOD 30 MIN: CPT

## 2025-04-21 ENCOUNTER — APPOINTMENT (OUTPATIENT)
Dept: ELECTROPHYSIOLOGY | Facility: CLINIC | Age: 55
End: 2025-04-21
Payer: COMMERCIAL

## 2025-04-21 ENCOUNTER — NON-APPOINTMENT (OUTPATIENT)
Age: 55
End: 2025-04-21

## 2025-04-21 VITALS
WEIGHT: 230 LBS | BODY MASS INDEX: 34.07 KG/M2 | SYSTOLIC BLOOD PRESSURE: 132 MMHG | HEART RATE: 78 BPM | HEIGHT: 69 IN | DIASTOLIC BLOOD PRESSURE: 82 MMHG

## 2025-04-21 DIAGNOSIS — I47.29 OTHER VENTRICULAR TACHYCARDIA: ICD-10-CM

## 2025-04-21 DIAGNOSIS — E78.5 HYPERLIPIDEMIA, UNSPECIFIED: ICD-10-CM

## 2025-04-21 DIAGNOSIS — I10 ESSENTIAL (PRIMARY) HYPERTENSION: ICD-10-CM

## 2025-04-21 DIAGNOSIS — I27.20 PULMONARY HYPERTENSION, UNSPECIFIED: ICD-10-CM

## 2025-04-21 DIAGNOSIS — G47.33 OBSTRUCTIVE SLEEP APNEA (ADULT) (PEDIATRIC): ICD-10-CM

## 2025-04-21 PROCEDURE — 93000 ELECTROCARDIOGRAM COMPLETE: CPT

## 2025-04-21 PROCEDURE — 99215 OFFICE O/P EST HI 40 MIN: CPT | Mod: 25

## 2025-05-15 ENCOUNTER — APPOINTMENT (OUTPATIENT)
Dept: CARDIOLOGY | Facility: CLINIC | Age: 55
End: 2025-05-15
Payer: COMMERCIAL

## 2025-05-15 ENCOUNTER — NON-APPOINTMENT (OUTPATIENT)
Age: 55
End: 2025-05-15

## 2025-05-15 DIAGNOSIS — I47.29 OTHER VENTRICULAR TACHYCARDIA: ICD-10-CM

## 2025-05-15 PROCEDURE — 93320 DOPPLER ECHO COMPLETE: CPT

## 2025-05-15 PROCEDURE — 93351 STRESS TTE COMPLETE: CPT

## 2025-05-15 PROCEDURE — 93325 DOPPLER ECHO COLOR FLOW MAPG: CPT

## 2025-06-03 ENCOUNTER — OUTPATIENT (OUTPATIENT)
Dept: OUTPATIENT SERVICES | Facility: HOSPITAL | Age: 55
LOS: 1 days | Discharge: HOME | End: 2025-06-03
Payer: COMMERCIAL

## 2025-06-03 VITALS
RESPIRATION RATE: 17 BRPM | HEART RATE: 85 BPM | WEIGHT: 229.94 LBS | TEMPERATURE: 99 F | HEIGHT: 69 IN | SYSTOLIC BLOOD PRESSURE: 155 MMHG | DIASTOLIC BLOOD PRESSURE: 76 MMHG | OXYGEN SATURATION: 99 %

## 2025-06-03 VITALS — HEART RATE: 91 BPM | SYSTOLIC BLOOD PRESSURE: 163 MMHG | DIASTOLIC BLOOD PRESSURE: 74 MMHG

## 2025-06-03 DIAGNOSIS — Z98.890 OTHER SPECIFIED POSTPROCEDURAL STATES: Chronic | ICD-10-CM

## 2025-06-03 DIAGNOSIS — H25.11 AGE-RELATED NUCLEAR CATARACT, RIGHT EYE: ICD-10-CM

## 2025-06-03 PROCEDURE — V2632: CPT

## 2025-06-03 NOTE — ASU PATIENT PROFILE, ADULT - FALL HARM RISK - PATIENT NEEDS ASSISTANCE
"Preadmit appointment: \" Preparing for your Procedure information\" sheet given to patient with verbal and written instructions. Patient instructed to continue prescribed medications through the day before surgery, instructed to take the following medications the day of surgery per anesthesia protocol: albuterol and symbicort as needed, nitroglycerine if needed, cymbalta, gabapentin, lorazipam if needed, metoprolol, xanax as needed, oxycodone as needed, protonix.       Pt states, \"no issues with anesthesia\".  Fasting guidelines per MD's instructions for type of diet and when to initiate NPO status.      All Pt's questions and concerns answered or addressed. Will check with Dr. Matute as to when to stop and restart Plavix. Labs 1/26/24 at [facility].  " No assistance needed

## 2025-06-03 NOTE — ASU PATIENT PROFILE, ADULT - FALL HARM RISK - HARM RISK INTERVENTIONS

## 2025-06-06 DIAGNOSIS — Z88.0 ALLERGY STATUS TO PENICILLIN: ICD-10-CM

## 2025-06-06 DIAGNOSIS — I47.10 SUPRAVENTRICULAR TACHYCARDIA, UNSPECIFIED: ICD-10-CM

## 2025-06-06 DIAGNOSIS — H25.11 AGE-RELATED NUCLEAR CATARACT, RIGHT EYE: ICD-10-CM

## 2025-06-06 DIAGNOSIS — G47.33 OBSTRUCTIVE SLEEP APNEA (ADULT) (PEDIATRIC): ICD-10-CM

## 2025-06-06 DIAGNOSIS — I10 ESSENTIAL (PRIMARY) HYPERTENSION: ICD-10-CM

## 2025-06-26 ENCOUNTER — APPOINTMENT (OUTPATIENT)
Dept: CARDIOLOGY | Facility: CLINIC | Age: 55
End: 2025-06-26
Payer: COMMERCIAL

## 2025-06-26 VITALS
WEIGHT: 230 LBS | SYSTOLIC BLOOD PRESSURE: 122 MMHG | HEIGHT: 69 IN | DIASTOLIC BLOOD PRESSURE: 83 MMHG | HEART RATE: 82 BPM | BODY MASS INDEX: 34.07 KG/M2

## 2025-06-26 PROCEDURE — 99204 OFFICE O/P NEW MOD 45 MIN: CPT | Mod: 25

## 2025-06-26 PROCEDURE — 93000 ELECTROCARDIOGRAM COMPLETE: CPT

## 2025-06-27 ENCOUNTER — APPOINTMENT (OUTPATIENT)
Dept: UROLOGY | Facility: CLINIC | Age: 55
End: 2025-06-27
Payer: COMMERCIAL

## 2025-06-27 VITALS
RESPIRATION RATE: 18 BRPM | DIASTOLIC BLOOD PRESSURE: 84 MMHG | HEART RATE: 93 BPM | WEIGHT: 231 LBS | BODY MASS INDEX: 34.21 KG/M2 | OXYGEN SATURATION: 96 % | HEIGHT: 69 IN | SYSTOLIC BLOOD PRESSURE: 150 MMHG

## 2025-06-27 PROCEDURE — 99215 OFFICE O/P EST HI 40 MIN: CPT

## 2025-06-27 PROCEDURE — G2211 COMPLEX E/M VISIT ADD ON: CPT | Mod: NC

## 2025-07-03 RX ORDER — TIRZEPATIDE 2.5 MG/.5ML
2.5 INJECTION, SOLUTION SUBCUTANEOUS
Qty: 4 | Refills: 3 | Status: ACTIVE | COMMUNITY
Start: 2025-06-26 | End: 1900-01-01

## 2025-09-04 ENCOUNTER — APPOINTMENT (OUTPATIENT)
Dept: CARDIOLOGY | Facility: CLINIC | Age: 55
End: 2025-09-04